# Patient Record
Sex: MALE | Race: WHITE | NOT HISPANIC OR LATINO | URBAN - METROPOLITAN AREA
[De-identification: names, ages, dates, MRNs, and addresses within clinical notes are randomized per-mention and may not be internally consistent; named-entity substitution may affect disease eponyms.]

---

## 2017-06-19 ENCOUNTER — HISTORICAL (OUTPATIENT)
Dept: ADMINISTRATIVE | Facility: HOSPITAL | Age: 47
End: 2017-06-19

## 2017-06-19 LAB
ABS NEUT (OLG): 2.68 X10(3)/MCL (ref 2.1–9.2)
ALBUMIN SERPL-MCNC: 4.4 GM/DL (ref 3.4–5)
ALBUMIN/GLOB SERPL: 1 RATIO (ref 1–2)
ALP SERPL-CCNC: 91 UNIT/L (ref 20–120)
ALT SERPL-CCNC: 42 UNIT/L
APPEARANCE, UA: CLEAR
AST SERPL-CCNC: 25 UNIT/L
BACTERIA #/AREA URNS AUTO: ABNORMAL /[HPF]
BASOPHILS # BLD AUTO: 0.05 X10(3)/MCL
BASOPHILS NFR BLD AUTO: 1 % (ref 0–1)
BILIRUB SERPL-MCNC: 1.4 MG/DL
BILIRUB UR QL STRIP: NEGATIVE
BILIRUBIN DIRECT+TOT PNL SERPL-MCNC: 0.2 MG/DL
BILIRUBIN DIRECT+TOT PNL SERPL-MCNC: 1.2 MG/DL
BUN SERPL-MCNC: 24 MG/DL (ref 7–25)
CALCIUM SERPL-MCNC: 8.9 MG/DL (ref 8.4–10.3)
CHLORIDE SERPL-SCNC: 98 MMOL/L (ref 96–110)
CHOLEST SERPL-MCNC: 217 MG/DL
CHOLEST/HDLC SERPL: 6.4 {RATIO} (ref 0–5)
CO2 SERPL-SCNC: 27 MMOL/L (ref 24–32)
COLOR UR: YELLOW
CREAT SERPL-MCNC: 1.14 MG/DL (ref 0.7–1.4)
CREAT UR-MCNC: 126.3 MG/DL
DEPRECATED CALCIDIOL+CALCIFEROL SERPL-MC: 12.35 NG/ML (ref 30–80)
EOSINOPHIL # BLD AUTO: 0.1 X10(3)/MCL
EOSINOPHIL NFR BLD AUTO: 2 % (ref 0–5)
ERYTHROCYTE [DISTWIDTH] IN BLOOD BY AUTOMATED COUNT: 12.9 % (ref 11.5–14.5)
EST. AVERAGE GLUCOSE BLD GHB EST-MCNC: 217 MG/DL
GLOBULIN SER-MCNC: 3.2 GM/ML (ref 2.3–3.5)
GLUCOSE (UA): >1000 MG/DL
GLUCOSE SERPL-MCNC: 218 MG/DL (ref 65–99)
HAV IGM SERPL QL IA: NONREACTIVE
HBA1C MFR BLD: 9.2 % (ref 4.7–5.6)
HBV CORE IGM SERPL QL IA: NONREACTIVE
HBV SURFACE AG SERPL QL IA: NEGATIVE
HCT VFR BLD AUTO: 51.4 % (ref 40–51)
HCV AB SERPL QL IA: NONREACTIVE
HDLC SERPL-MCNC: 34 MG/DL
HGB BLD-MCNC: 18.1 GM/DL (ref 13.5–17.5)
HGB UR QL STRIP: NEGATIVE
HIV 1+2 AB+HIV1 P24 AG SERPL QL IA: NONREACTIVE
HYALINE CASTS #/AREA URNS LPF: ABNORMAL /[LPF]
IMM GRANULOCYTES # BLD AUTO: 0.02 10*3/UL
IMM GRANULOCYTES NFR BLD AUTO: 0 %
KETONES UR QL STRIP: 10 MG/DL
LDLC SERPL CALC-MCNC: 146 MG/DL (ref 0–130)
LEUKOCYTE ESTERASE UR QL STRIP: NEGATIVE
LYMPHOCYTES # BLD AUTO: 1.77 X10(3)/MCL
LYMPHOCYTES NFR BLD AUTO: 36 % (ref 15–40)
MCH RBC QN AUTO: 30.5 PG (ref 26–34)
MCHC RBC AUTO-ENTMCNC: 35.2 GM/DL (ref 31–37)
MCV RBC AUTO: 86.5 FL (ref 80–100)
MICROALBUMIN UR-MCNC: 25 MG/L (ref 0–19)
MICROALBUMIN/CREAT RATIO PNL UR: 19.8 MCG/MG CR (ref 0–29)
MONOCYTES # BLD AUTO: 0.3 X10(3)/MCL
MONOCYTES NFR BLD AUTO: 6 % (ref 4–12)
NEUTROPHILS # BLD AUTO: 2.68 X10(3)/MCL
NEUTROPHILS NFR BLD AUTO: 54 X10(3)/MCL
NITRITE UR QL STRIP: NEGATIVE
PH UR STRIP: 6.5 [PH] (ref 4.5–8)
PLATELET # BLD AUTO: 228 X10(3)/MCL (ref 130–400)
PMV BLD AUTO: 10 FL (ref 7.4–10.4)
POTASSIUM SERPL-SCNC: 4.1 MMOL/L (ref 3.6–5.2)
PROT SERPL-MCNC: 7.6 GM/DL (ref 6–8)
PROT UR QL STRIP: 10 MG/DL
PSA SERPL-MCNC: 0.4 NG/ML (ref 0–4)
RBC # BLD AUTO: 5.94 X10(6)/MCL (ref 4.5–5.9)
RBC #/AREA URNS AUTO: ABNORMAL /[HPF]
SODIUM SERPL-SCNC: 133 MMOL/L (ref 135–146)
SP GR UR STRIP: 1.02 (ref 1–1.03)
SQUAMOUS #/AREA URNS LPF: ABNORMAL /[LPF]
TRIGL SERPL-MCNC: 184 MG/DL
TSH SERPL-ACNC: 1.58 MIU/L (ref 0.5–5)
UROBILINOGEN UR STRIP-ACNC: NORMAL
VLDLC SERPL CALC-MCNC: 37 MG/DL
WBC # SPEC AUTO: 4.9 X10(3)/MCL (ref 4.5–11)
WBC #/AREA URNS AUTO: ABNORMAL /HPF

## 2017-10-31 ENCOUNTER — HISTORICAL (OUTPATIENT)
Dept: INTERNAL MEDICINE | Facility: CLINIC | Age: 47
End: 2017-10-31

## 2017-10-31 LAB
APPEARANCE, UA: CLEAR
BACTERIA #/AREA URNS AUTO: ABNORMAL /[HPF]
BILIRUB UR QL STRIP: NEGATIVE
BUN SERPL-MCNC: 17 MG/DL (ref 7–18)
CALCIUM SERPL-MCNC: 8.8 MG/DL (ref 8.5–10.1)
CHLORIDE SERPL-SCNC: 103 MMOL/L (ref 98–107)
CHOLEST SERPL-MCNC: 216 MG/DL
CHOLEST/HDLC SERPL: 5.4 {RATIO} (ref 0–5)
CO2 SERPL-SCNC: 31 MMOL/L (ref 21–32)
COLOR UR: YELLOW
CREAT SERPL-MCNC: 1.2 MG/DL (ref 0.6–1.3)
EST. AVERAGE GLUCOSE BLD GHB EST-MCNC: 169 MG/DL
GLUCOSE (UA): 500 MG/DL
GLUCOSE SERPL-MCNC: 202 MG/DL (ref 74–106)
HBA1C MFR BLD: 7.5 % (ref 4.2–6.3)
HDLC SERPL-MCNC: 40 MG/DL
HGB UR QL STRIP: NEGATIVE
HYALINE CASTS #/AREA URNS LPF: ABNORMAL /[LPF]
KETONES UR QL STRIP: NEGATIVE
LDLC SERPL CALC-MCNC: 135 MG/DL (ref 0–130)
LEUKOCYTE ESTERASE UR QL STRIP: NEGATIVE
NITRITE UR QL STRIP: NEGATIVE
PH UR STRIP: 6.5 [PH] (ref 4.5–8)
POTASSIUM SERPL-SCNC: 4.2 MMOL/L (ref 3.5–5.1)
PROT UR QL STRIP: NEGATIVE
RBC #/AREA URNS AUTO: ABNORMAL /[HPF]
SODIUM SERPL-SCNC: 138 MMOL/L (ref 136–145)
SP GR UR STRIP: 1.01 (ref 1–1.03)
SQUAMOUS #/AREA URNS LPF: ABNORMAL /[LPF]
TRIGL SERPL-MCNC: 206 MG/DL
UROBILINOGEN UR STRIP-ACNC: 2 MG/DL
VLDLC SERPL CALC-MCNC: 41 MG/DL
WBC #/AREA URNS AUTO: ABNORMAL /HPF

## 2018-02-14 ENCOUNTER — HISTORICAL (OUTPATIENT)
Dept: INTERNAL MEDICINE | Facility: CLINIC | Age: 48
End: 2018-02-14

## 2018-02-14 LAB
BUN SERPL-MCNC: 13 MG/DL (ref 7–18)
CALCIUM SERPL-MCNC: 9.4 MG/DL (ref 8.5–10.1)
CHLORIDE SERPL-SCNC: 104 MMOL/L (ref 98–107)
CHOLEST SERPL-MCNC: 206 MG/DL
CHOLEST/HDLC SERPL: 6.4 {RATIO} (ref 0–5)
CO2 SERPL-SCNC: 30 MMOL/L (ref 21–32)
CREAT SERPL-MCNC: 1.1 MG/DL (ref 0.6–1.3)
CREAT UR-MCNC: 101 MG/DL
EST. AVERAGE GLUCOSE BLD GHB EST-MCNC: 163 MG/DL
GLUCOSE SERPL-MCNC: 170 MG/DL (ref 74–106)
HBA1C MFR BLD: 7.3 % (ref 4.2–6.3)
HDLC SERPL-MCNC: 32 MG/DL
LDLC SERPL CALC-MCNC: 141 MG/DL (ref 0–130)
MICROALBUMIN UR-MCNC: 16.7 MG/L (ref 0–19)
MICROALBUMIN/CREAT RATIO PNL UR: 16.5 MCG/MG CR (ref 0–29)
POTASSIUM SERPL-SCNC: 4.4 MMOL/L (ref 3.5–5.1)
SODIUM SERPL-SCNC: 138 MMOL/L (ref 136–145)
TRIGL SERPL-MCNC: 164 MG/DL
VLDLC SERPL CALC-MCNC: 33 MG/DL

## 2018-06-20 ENCOUNTER — HISTORICAL (OUTPATIENT)
Dept: INTERNAL MEDICINE | Facility: CLINIC | Age: 48
End: 2018-06-20

## 2018-06-20 LAB
BUN SERPL-MCNC: 15 MG/DL (ref 7–18)
CALCIUM SERPL-MCNC: 9.1 MG/DL (ref 8.5–10.1)
CHLORIDE SERPL-SCNC: 103 MMOL/L (ref 98–107)
CO2 SERPL-SCNC: 27 MMOL/L (ref 21–32)
CREAT SERPL-MCNC: 1.1 MG/DL (ref 0.6–1.3)
CREAT UR-MCNC: 92 MG/DL
CREAT/UREA NIT SERPL: 14
EST. AVERAGE GLUCOSE BLD GHB EST-MCNC: 151 MG/DL
GLUCOSE SERPL-MCNC: 160 MG/DL (ref 74–106)
HBA1C MFR BLD: 6.9 % (ref 4.2–6.3)
MICROALBUMIN UR-MCNC: 7.4 MG/L (ref 0–19)
MICROALBUMIN/CREAT RATIO PNL UR: 8 MCG/MG CR (ref 0–29)
POTASSIUM SERPL-SCNC: 4.1 MMOL/L (ref 3.5–5.1)
SODIUM SERPL-SCNC: 140 MMOL/L (ref 136–145)

## 2018-08-30 ENCOUNTER — HISTORICAL (OUTPATIENT)
Dept: INTERNAL MEDICINE | Facility: CLINIC | Age: 48
End: 2018-08-30

## 2018-08-30 LAB
ABS NEUT (OLG): 3.41 X10(3)/MCL (ref 2.1–9.2)
ALBUMIN SERPL-MCNC: 4 GM/DL (ref 3.4–5)
ALBUMIN/GLOB SERPL: 1 RATIO (ref 1–2)
ALP SERPL-CCNC: 107 UNIT/L (ref 45–117)
ALT SERPL-CCNC: 40 UNIT/L (ref 12–78)
AST SERPL-CCNC: 18 UNIT/L (ref 15–37)
BASOPHILS # BLD AUTO: 0.06 X10(3)/MCL
BASOPHILS NFR BLD AUTO: 1 %
BILIRUB SERPL-MCNC: 1.4 MG/DL (ref 0.2–1)
BILIRUBIN DIRECT+TOT PNL SERPL-MCNC: 0.3 MG/DL
BILIRUBIN DIRECT+TOT PNL SERPL-MCNC: 1.1 MG/DL
BUN SERPL-MCNC: 14 MG/DL (ref 7–18)
CALCIUM SERPL-MCNC: 9 MG/DL (ref 8.5–10.1)
CHLORIDE SERPL-SCNC: 102 MMOL/L (ref 98–107)
CHOLEST SERPL-MCNC: 190 MG/DL
CHOLEST/HDLC SERPL: 5.3 {RATIO} (ref 0–5)
CO2 SERPL-SCNC: 29 MMOL/L (ref 21–32)
CREAT SERPL-MCNC: 1.1 MG/DL (ref 0.6–1.3)
CREAT UR-MCNC: 56 MG/DL
EOSINOPHIL # BLD AUTO: 0.14 X10(3)/MCL
EOSINOPHIL NFR BLD AUTO: 2 %
ERYTHROCYTE [DISTWIDTH] IN BLOOD BY AUTOMATED COUNT: 13 % (ref 11.5–14.5)
EST. AVERAGE GLUCOSE BLD GHB EST-MCNC: 131 MG/DL
GLOBULIN SER-MCNC: 4 GM/ML (ref 2.3–3.5)
GLUCOSE SERPL-MCNC: 138 MG/DL (ref 74–106)
HAV IGM SERPL QL IA: NONREACTIVE
HBA1C MFR BLD: 6.2 % (ref 4.2–6.3)
HBV CORE IGM SERPL QL IA: NONREACTIVE
HBV SURFACE AG SERPL QL IA: NEGATIVE
HCT VFR BLD AUTO: 52.9 % (ref 40–51)
HCV AB SERPL QL IA: NONREACTIVE
HDLC SERPL-MCNC: 36 MG/DL
HGB BLD-MCNC: 18.3 GM/DL (ref 13.5–17.5)
HIV 1+2 AB+HIV1 P24 AG SERPL QL IA: NONREACTIVE
IMM GRANULOCYTES # BLD AUTO: 0.02 10*3/UL
IMM GRANULOCYTES NFR BLD AUTO: 0 %
LDLC SERPL CALC-MCNC: 118 MG/DL (ref 0–130)
LYMPHOCYTES # BLD AUTO: 2.02 X10(3)/MCL
LYMPHOCYTES NFR BLD AUTO: 33 % (ref 13–40)
MCH RBC QN AUTO: 31.4 PG (ref 26–34)
MCHC RBC AUTO-ENTMCNC: 34.6 GM/DL (ref 31–37)
MCV RBC AUTO: 90.7 FL (ref 80–100)
MICROALBUMIN UR-MCNC: <5 MG/L (ref 0–19)
MICROALBUMIN/CREAT RATIO PNL UR: <8.9 MCG/MG CR (ref 0–29)
MONOCYTES # BLD AUTO: 0.44 X10(3)/MCL
MONOCYTES NFR BLD AUTO: 7 % (ref 4–12)
NEUTROPHILS # BLD AUTO: 3.41 X10(3)/MCL
NEUTROPHILS NFR BLD AUTO: 56 X10(3)/MCL
PLATELET # BLD AUTO: 274 X10(3)/MCL (ref 130–400)
PMV BLD AUTO: 9.8 FL (ref 7.4–10.4)
POTASSIUM SERPL-SCNC: 4.2 MMOL/L (ref 3.5–5.1)
PROT SERPL-MCNC: 8 GM/DL (ref 6.4–8.2)
RBC # BLD AUTO: 5.83 X10(6)/MCL (ref 4.5–5.9)
SODIUM SERPL-SCNC: 136 MMOL/L (ref 136–145)
TRIGL SERPL-MCNC: 179 MG/DL
TSH SERPL-ACNC: 3.09 MIU/L (ref 0.36–3.74)
VLDLC SERPL CALC-MCNC: 36 MG/DL
WBC # SPEC AUTO: 6.1 X10(3)/MCL (ref 4.5–11)

## 2019-01-17 ENCOUNTER — HISTORICAL (OUTPATIENT)
Dept: INTERNAL MEDICINE | Facility: CLINIC | Age: 49
End: 2019-01-17

## 2019-01-17 LAB
ALBUMIN SERPL-MCNC: 3.9 GM/DL (ref 3.4–5)
ALBUMIN/GLOB SERPL: 1 RATIO (ref 1–2)
ALP SERPL-CCNC: 105 UNIT/L (ref 45–117)
ALT SERPL-CCNC: 25 UNIT/L (ref 12–78)
AST SERPL-CCNC: 15 UNIT/L (ref 15–37)
BILIRUB SERPL-MCNC: 1.2 MG/DL (ref 0.2–1)
BILIRUBIN DIRECT+TOT PNL SERPL-MCNC: 0.3 MG/DL
BILIRUBIN DIRECT+TOT PNL SERPL-MCNC: 0.9 MG/DL
BUN SERPL-MCNC: 12 MG/DL (ref 7–18)
CALCIUM SERPL-MCNC: 8.6 MG/DL (ref 8.5–10.1)
CHLORIDE SERPL-SCNC: 99 MMOL/L (ref 98–107)
CHOLEST SERPL-MCNC: 139 MG/DL
CHOLEST/HDLC SERPL: 3.9 {RATIO} (ref 0–5)
CO2 SERPL-SCNC: 32 MMOL/L (ref 21–32)
CREAT SERPL-MCNC: 1.2 MG/DL (ref 0.6–1.3)
CREAT UR-MCNC: 32 MG/DL
ERYTHROCYTE [DISTWIDTH] IN BLOOD BY AUTOMATED COUNT: 12.6 % (ref 11.5–14.5)
EST. AVERAGE GLUCOSE BLD GHB EST-MCNC: 151 MG/DL
GLOBULIN SER-MCNC: 4.1 GM/ML (ref 2.3–3.5)
GLUCOSE SERPL-MCNC: 137 MG/DL (ref 74–106)
HBA1C MFR BLD: 6.9 % (ref 4.2–6.3)
HCT VFR BLD AUTO: 50.7 % (ref 40–51)
HDLC SERPL-MCNC: 36 MG/DL
HGB BLD-MCNC: 17.7 GM/DL (ref 13.5–17.5)
LDLC SERPL CALC-MCNC: 73 MG/DL (ref 0–130)
MCH RBC QN AUTO: 30.9 PG (ref 26–34)
MCHC RBC AUTO-ENTMCNC: 34.9 GM/DL (ref 31–37)
MCV RBC AUTO: 88.5 FL (ref 80–100)
MICROALBUMIN UR-MCNC: <5 MG/L (ref 0–19)
MICROALBUMIN/CREAT RATIO PNL UR: NORMAL MCG/MG CR (ref 0–29)
PLATELET # BLD AUTO: 294 X10(3)/MCL (ref 130–400)
PMV BLD AUTO: 9.5 FL (ref 7.4–10.4)
POTASSIUM SERPL-SCNC: 4 MMOL/L (ref 3.5–5.1)
PROT SERPL-MCNC: 8 GM/DL (ref 6.4–8.2)
RBC # BLD AUTO: 5.73 X10(6)/MCL (ref 4.5–5.9)
SODIUM SERPL-SCNC: 135 MMOL/L (ref 136–145)
TRIGL SERPL-MCNC: 148 MG/DL
VLDLC SERPL CALC-MCNC: 30 MG/DL
WBC # SPEC AUTO: 6.3 X10(3)/MCL (ref 4.5–11)

## 2019-09-22 ENCOUNTER — HISTORICAL (OUTPATIENT)
Dept: LAB | Facility: HOSPITAL | Age: 49
End: 2019-09-22

## 2019-09-22 LAB
ALBUMIN SERPL-MCNC: 3.8 GM/DL (ref 3.4–5)
ALBUMIN/GLOB SERPL: 1.1 RATIO (ref 1.1–2)
ALP SERPL-CCNC: 87 UNIT/L (ref 45–117)
ALT SERPL-CCNC: 26 UNIT/L (ref 12–78)
AST SERPL-CCNC: 14 UNIT/L (ref 15–37)
BILIRUB SERPL-MCNC: 1.2 MG/DL (ref 0.2–1)
BILIRUBIN DIRECT+TOT PNL SERPL-MCNC: 0.3 MG/DL (ref 0–0.2)
BILIRUBIN DIRECT+TOT PNL SERPL-MCNC: 0.9 MG/DL
BUN SERPL-MCNC: 16 MG/DL (ref 7–18)
CALCIUM SERPL-MCNC: 8.5 MG/DL (ref 8.5–10.1)
CHLORIDE SERPL-SCNC: 103 MMOL/L (ref 98–107)
CO2 SERPL-SCNC: 29 MMOL/L (ref 21–32)
CREAT SERPL-MCNC: 1.1 MG/DL (ref 0.6–1.3)
CREAT UR-MCNC: 169 MG/DL
EST. AVERAGE GLUCOSE BLD GHB EST-MCNC: 131 MG/DL
GLOBULIN SER-MCNC: 3.5 GM/ML (ref 2.3–3.5)
GLUCOSE SERPL-MCNC: 126 MG/DL (ref 74–106)
HBA1C MFR BLD: 6.2 % (ref 4.2–6.3)
MICROALBUMIN UR-MCNC: 6.3 MG/L (ref 0–19)
MICROALBUMIN/CREAT RATIO PNL UR: 3.7 MCG/MG CR (ref 0–29)
POTASSIUM SERPL-SCNC: 3.9 MMOL/L (ref 3.5–5.1)
PROT SERPL-MCNC: 7.3 GM/DL (ref 6.4–8.2)
SODIUM SERPL-SCNC: 138 MMOL/L (ref 136–145)

## 2020-05-25 ENCOUNTER — HISTORICAL (OUTPATIENT)
Dept: INTERNAL MEDICINE | Facility: CLINIC | Age: 50
End: 2020-05-25

## 2020-05-25 LAB
ABS NEUT (OLG): 3.65 X10(3)/MCL (ref 2.1–9.2)
ALBUMIN SERPL-MCNC: 3.6 GM/DL (ref 3.4–5)
ALBUMIN/GLOB SERPL: 1 RATIO (ref 1.1–2)
ALP SERPL-CCNC: 95 UNIT/L (ref 45–117)
ALT SERPL-CCNC: 28 UNIT/L (ref 12–78)
APPEARANCE, UA: CLEAR
AST SERPL-CCNC: 20 UNIT/L (ref 15–37)
BACTERIA #/AREA URNS AUTO: ABNORMAL /HPF
BASOPHILS # BLD AUTO: 0.1 X10(3)/MCL (ref 0–0.2)
BASOPHILS NFR BLD AUTO: 1 %
BILIRUB SERPL-MCNC: 1.2 MG/DL (ref 0.2–1)
BILIRUB UR QL STRIP: NEGATIVE
BILIRUBIN DIRECT+TOT PNL SERPL-MCNC: 0.2 MG/DL (ref 0–0.2)
BILIRUBIN DIRECT+TOT PNL SERPL-MCNC: 1 MG/DL
BUN SERPL-MCNC: 20 MG/DL (ref 7–18)
CALCIUM SERPL-MCNC: 8.6 MG/DL (ref 8.5–10.1)
CHLORIDE SERPL-SCNC: 106 MMOL/L (ref 98–107)
CHOLEST SERPL-MCNC: 143 MG/DL
CHOLEST/HDLC SERPL: 4.1 {RATIO} (ref 0–5)
CO2 SERPL-SCNC: 29 MMOL/L (ref 21–32)
COLOR UR: ABNORMAL
CREAT SERPL-MCNC: 1.1 MG/DL (ref 0.6–1.3)
CREAT UR-MCNC: 54 MG/DL
EOSINOPHIL # BLD AUTO: 0.1 X10(3)/MCL (ref 0–0.9)
EOSINOPHIL NFR BLD AUTO: 2 %
ERYTHROCYTE [DISTWIDTH] IN BLOOD BY AUTOMATED COUNT: 13.1 % (ref 11.5–14.5)
EST. AVERAGE GLUCOSE BLD GHB EST-MCNC: 157 MG/DL
GLOBULIN SER-MCNC: 3.5 GM/ML (ref 2.3–3.5)
GLUCOSE (UA): 300 MG/DL
GLUCOSE SERPL-MCNC: 157 MG/DL (ref 74–106)
HAV IGM SERPL QL IA: NONREACTIVE
HBA1C MFR BLD: 7.1 % (ref 4.2–6.3)
HBV CORE IGM SERPL QL IA: NONREACTIVE
HBV SURFACE AG SERPL QL IA: NONREACTIVE
HCT VFR BLD AUTO: 46.9 % (ref 40–51)
HCV AB SERPL QL IA: NONREACTIVE
HDLC SERPL-MCNC: 35 MG/DL (ref 40–59)
HGB BLD-MCNC: 16 GM/DL (ref 13.5–17.5)
HGB UR QL STRIP: NEGATIVE
HIV 1+2 AB+HIV1 P24 AG SERPL QL IA: NONREACTIVE
HYALINE CASTS #/AREA URNS LPF: ABNORMAL /LPF
IMM GRANULOCYTES # BLD AUTO: 0.02 10*3/UL
IMM GRANULOCYTES NFR BLD AUTO: 0 %
KETONES UR QL STRIP: NEGATIVE
LDLC SERPL CALC-MCNC: 80 MG/DL
LEUKOCYTE ESTERASE UR QL STRIP: NEGATIVE
LYMPHOCYTES # BLD AUTO: 1.4 X10(3)/MCL (ref 0.6–4.6)
LYMPHOCYTES NFR BLD AUTO: 25 %
MCH RBC QN AUTO: 30.8 PG (ref 26–34)
MCHC RBC AUTO-ENTMCNC: 34.1 GM/DL (ref 31–37)
MCV RBC AUTO: 90.2 FL (ref 80–100)
MICROALBUMIN UR-MCNC: <5 MG/L (ref 0–19)
MICROALBUMIN/CREAT RATIO PNL UR: <9.3 MCG/MG CR (ref 0–29)
MONOCYTES # BLD AUTO: 0.4 X10(3)/MCL (ref 0.1–1.3)
MONOCYTES NFR BLD AUTO: 8 %
NEUTROPHILS # BLD AUTO: 3.65 X10(3)/MCL (ref 2.1–9.2)
NEUTROPHILS NFR BLD AUTO: 64 %
NITRITE UR QL STRIP: NEGATIVE
PH UR STRIP: 7 [PH] (ref 4.5–8)
PLATELET # BLD AUTO: 224 X10(3)/MCL (ref 130–400)
PMV BLD AUTO: 9.3 FL (ref 7.4–10.4)
POTASSIUM SERPL-SCNC: 4.2 MMOL/L (ref 3.5–5.1)
PROT SERPL-MCNC: 7.1 GM/DL (ref 6.4–8.2)
PROT UR QL STRIP: NEGATIVE
RBC # BLD AUTO: 5.2 X10(6)/MCL (ref 4.5–5.9)
RBC #/AREA URNS AUTO: ABNORMAL /HPF
SODIUM SERPL-SCNC: 138 MMOL/L (ref 136–145)
SP GR UR STRIP: 1.01 (ref 1–1.03)
SQUAMOUS #/AREA URNS LPF: ABNORMAL /LPF
TRIGL SERPL-MCNC: 141 MG/DL
TSH SERPL-ACNC: 2.3 MIU/L (ref 0.36–3.74)
UROBILINOGEN UR STRIP-ACNC: NORMAL
VLDLC SERPL CALC-MCNC: 28 MG/DL
WBC # SPEC AUTO: 5.7 X10(3)/MCL (ref 4.5–11)
WBC #/AREA URNS AUTO: ABNORMAL /HPF

## 2021-06-02 ENCOUNTER — HISTORICAL (OUTPATIENT)
Dept: INTERNAL MEDICINE | Facility: CLINIC | Age: 51
End: 2021-06-02

## 2021-06-02 LAB
ALBUMIN SERPL-MCNC: 4.1 GM/DL (ref 3.5–5)
ALBUMIN/GLOB SERPL: 1.2 RATIO (ref 1.1–2)
ALP SERPL-CCNC: 101 UNIT/L (ref 40–150)
ALT SERPL-CCNC: 18 UNIT/L (ref 0–55)
AST SERPL-CCNC: 18 UNIT/L (ref 5–34)
BILIRUB SERPL-MCNC: 1.4 MG/DL
BILIRUBIN DIRECT+TOT PNL SERPL-MCNC: 0.4 MG/DL (ref 0–0.5)
BILIRUBIN DIRECT+TOT PNL SERPL-MCNC: 1 MG/DL (ref 0–0.8)
BUN SERPL-MCNC: 10 MG/DL (ref 8.9–20.6)
CALCIUM SERPL-MCNC: 9.3 MG/DL (ref 8.4–10.2)
CHLORIDE SERPL-SCNC: 100 MMOL/L (ref 98–107)
CHOLEST SERPL-MCNC: 210 MG/DL
CHOLEST/HDLC SERPL: 6 {RATIO} (ref 0–5)
CO2 SERPL-SCNC: 27 MMOL/L (ref 22–29)
CREAT SERPL-MCNC: 1.15 MG/DL (ref 0.73–1.18)
CREAT UR-MCNC: 41.6 MG/DL (ref 58–161)
EST. AVERAGE GLUCOSE BLD GHB EST-MCNC: 145.6 MG/DL
GLOBULIN SER-MCNC: 3.4 GM/DL (ref 2.4–3.5)
GLUCOSE SERPL-MCNC: 145 MG/DL (ref 74–100)
HBA1C MFR BLD: 6.7 %
HDLC SERPL-MCNC: 37 MG/DL (ref 35–60)
LDLC SERPL CALC-MCNC: 149 MG/DL (ref 50–140)
MICROALBUMIN UR-MCNC: <5 MG/L
MICROALBUMIN/CREAT RATIO PNL UR: <12 MG/GM CR (ref 0–30)
POTASSIUM SERPL-SCNC: 4.5 MMOL/L (ref 3.5–5.1)
PROT SERPL-MCNC: 7.5 GM/DL (ref 6.4–8.3)
SODIUM SERPL-SCNC: 138 MMOL/L (ref 136–145)
TRIGL SERPL-MCNC: 119 MG/DL (ref 34–140)
VLDLC SERPL CALC-MCNC: 24 MG/DL

## 2021-06-07 ENCOUNTER — HISTORICAL (OUTPATIENT)
Dept: INTERNAL MEDICINE | Facility: CLINIC | Age: 51
End: 2021-06-07

## 2022-05-02 NOTE — HISTORICAL [facility]
This is a historical note converted from [employer]. Formatting and pictures may have been removed.  Please reference [employer] for original formatting and attached multimedia. 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 Recently dx with DM2 at [facility] physical. ?Reports 1 year ago [facility] physical was told that he had glucose in his urine, but did not seek?medical attention after his physical.? Was restricted to have UTI physical?completed?in 1 year, which she completed on May 24, 2017.? Reported that he had?over thousand glucose in his urine and A1c?was?10.4%.? Has never been on diabetic medications or diet.? Reports his father was a diabetic.? Blood pressure elevated today, admits that he had high blood pressure years ago is on medication. ?Does not recall which medication it was. ?He had an eye exam March 2017, but it was not dilated exam. ?Additionally has history of obstructive sleep apnea, currently on CPAP nightly. ?Needs new prescription for new mask.? His diagnosis he has?done making?dietary changes, including eliminating sweets,?cutting down on carbohydrates.? He would like to start exercising soon.? He is well educated on proper diet for diabetes and would like more information.  Review of Systems  Constitutional: negative except as stated in HPI  Eye: negative except as stated in HPI  ENMT: negative except as stated in HPI  Respiratory: negative except as stated in HPI  Cardiovascular: negative except as stated in HPI  Gastrointestinal: negative except as stated in HPI  Genitourinary: negative except as stated in HPI  Hema/Lymph: negative except as stated in HPI  Endocrine: negative except as stated in HPI  Immunologic: negative except as stated in HPI  Musculoskeletal: negative except as stated in HPI  Integumentary: negative except as stated in HPI  Neurologic: negative except as stated in HPI  ?   All Other ROS_ ?negative except as  stated in HPI  Physical Exam  Vitals & Measurements  T:?36.7? ?C ?(Oral)? HR:?88?(Peripheral)? BP:?153/79? SpO2:?97%?  HT:?169?cm? HT:?169?cm? WT:?102?kg? WT:?102?kg? BMI:?35.71?  General: Alert and oriented, No acute distress.? Smells of tobacco smoke  Eye: Pupils are equal, round and reactive to light, Extraocular movements are intact.  HENT: Normocephalic.  Neck: Supple, Non-tender, No carotid bruit, No lymphadenopathy.  Respiratory: Lungs are clear to auscultation, Respirations are non-labored, Breath sounds are equal, Symmetrical chest wall expansion.  Cardiovascular: Normal rate, Regular rhythm, No murmur.  Gastrointestinal: Soft, Non-tender, Non-distended, Normal bowel sounds.  Musculoskeletal: Normal range of motion.  Integumentary: Warm, Dry, Intact.  Neurologic: No focal deficits, Cranial Nerves II-XII are grossly intact.  Assessment/Plan  1.?Type 2 diabetes mellitus  newly dx  ?ADA diet  CBG log  baby asa daily  eye exam referral  foot exam completed today  pneumovax-self pay  pcv 13-self pay  hep b-self pay  last LDL check today  last A1c 10.4 per pt report at recent DOT physical 5/24/17  start metformin er daily x 2 wks and then increase to bid  start acei  labs today and with rtc  Ordered:  Basic Metabolic Panel, Routine collect, *Est. 09/19/17 3:00:00 CDT, Blood, Order for future visit, *Est. Stop date 09/19/17 3:00:00 CDT, Lab Collect, Type 2 diabetes mellitus  Well adult exam, 3, month(s), In Approximately, 06/19/17 9:45:00 CDT  CBC w/ Auto Diff, Routine collect, *Est. 06/19/17 3:00:00 CDT, Blood, Order for future visit, *Est. Stop date 06/19/17 3:00:00 CDT, Lab Collect, Type 2 diabetes mellitus  Well adult exam, Sometime Before, 06/19/17 9:45:00 CDT  Clinic Follow up, *Est. 09/19/17 3:00:00 CDT, in 3 mos with NIMO Jerry, Order for future visit, Type 2 diabetes mellitus, [facility]  Clinic Follow up, *Est. 07/19/17 3:00:00 CDT, NURSE VISIT: BP check, 1 month, Order for future visit, Type 2  diabetes mellitus  HTN (hypertension)  Well adult exam  CHRISTIAN on CPAP, [facility]  Comprehensive Metabolic Panel, Routine collect, *Est. 06/19/17 3:00:00 CDT, Blood, Order for future visit, *Est. Stop date 06/19/17 3:00:00 CDT, Lab Collect, Type 2 diabetes mellitus  Well adult exam, Sometime Before, 06/19/17 9:45:00 CDT  Hemoglobin A1C [facility], Routine collect, *Est. 09/19/17 3:00:00 CDT, Blood, Order for future visit, *Est. Stop date 09/19/17 3:00:00 CDT, Lab Collect, Type 2 diabetes mellitus  Well adult exam, 3, month(s), In Approximately, 06/19/17 9:45:00 CDT  Hemoglobin A1C [facility], Routine collect, *Est. 06/19/17 3:00:00 CDT, Blood, Order for future visit, *Est. Stop date 06/19/17 3:00:00 CDT, Lab Collect, Type 2 diabetes mellitus  Well adult exam, Sometime Before, 06/19/17 9:45:00 CDT  Hepatitis Panel [facility], Routine collect, *Est. 06/19/17 3:00:00 CDT, Blood, Order for future visit, *Est. Stop date 06/19/17 3:00:00 CDT, Lab Collect, Type 2 diabetes mellitus  Well adult exam, Sometime Before, 06/19/17 9:45:00 CDT  HIV 1 and 2, Routine collect, *Est. 06/19/17 3:00:00 CDT, Blood, Order for future visit, *Est. Stop date 06/19/17 3:00:00 CDT, Lab Collect, Type 2 diabetes mellitus  Well adult exam, Sometime Before, 06/19/17 9:45:00 CDT  Internal Referral to Diabetes Education, Diabetes Education, *Est. 06/19/17 3:00:00 CDT, Future Visit?, Type 2 diabetes mellitus  Internal Referral to Nutrition, DM education, *Est. 07/19/17 3:00:00 CDT, Future Visit?, Type 2 diabetes mellitus  Internal Referral to Ophthalmology, DM eye exam, *Est. 06/19/17 3:00:00 CDT, Future Visit?, Type 2 diabetes mellitus  Lipid Panel, Routine collect, *Est. 09/19/17 3:00:00 CDT, Blood, Order for future visit, *Est. Stop date 09/19/17 3:00:00 CDT, Lab Collect, Type 2 diabetes mellitus  Well adult exam, 3, month(s), In Approximately, 06/19/17 9:45:00 CDT  Lipid Panel, Routine collect, *Est. 06/19/17 3:00:00 CDT, Blood, Order for future visit,  *Est. Stop date 06/19/17 3:00:00 CDT, Lab Collect, Type 2 diabetes mellitus  Well adult exam, Sometime Before, 06/19/17 9:45:00 CDT  Microalbumin Level Urine, Routine collect, Urine, Order for future visit, *Est. 06/19/17 3:00:00 CDT, *Est. Stop date 06/19/17 3:00:00 CDT, Nurse collect, Type 2 diabetes mellitus  Well adult exam, month(s), Sometime Before, 06/19/17 9:45:00 CDT  Microalbumin Level Urine, Routine collect, Urine, Order for future visit, *Est. 09/19/17 3:00:00 CDT, *Est. Stop date 09/19/17 3:00:00 CDT, Nurse collect, Type 2 diabetes mellitus  Well adult exam, day(s), 3, month(s), In Approximately, 06/19/17 9:45:00 CDT  Office/Outpatient Visit Level 4 New [id number] PC, Type 2 diabetes mellitus  HTN (hypertension)  Well adult exam  CHRISTIAN on CPAP, [facility], 06/19/17 9:59:00 CDT  Prostatic Specific Antigen Screening Test, Routine collect, *Est. 06/19/17 3:00:00 CDT, Blood, Order for future visit, *Est. Stop date 06/19/17 3:00:00 CDT, Lab Collect, Type 2 diabetes mellitus  Well adult exam, Sometime Before, 06/19/17 9:45:00 CDT  Thyroid Stimulating Hormone, Routine collect, *Est. 06/19/17 3:00:00 CDT, Blood, Order for future visit, *Est. Stop date 06/19/17 3:00:00 CDT, Lab Collect, Type 2 diabetes mellitus  Well adult exam, Sometime Before, 06/19/17 9:45:00 CDT  Urinalysis with Microscopic if Indicated, Routine collect, Urine, Order for future visit, *Est. 06/19/17 3:00:00 CDT, *Est. Stop date 06/19/17 3:00:00 CDT, Nurse collect, Type 2 diabetes mellitus  Well adult exam, month(s), Sometime Before, 06/19/17 9:45:00 CDT  Vitamin D, 25-Hydroxy Level, Routine collect, *Est. 06/19/17 3:00:00 CDT, Blood, Order for future visit, *Est. Stop date 06/19/17 3:00:00 CDT, Lab Collect, Type 2 diabetes mellitus  Well adult exam, Sometime Before, 06/19/17 9:45:00 CDT  ?  2.?HTN (hypertension)  elevated  start lisinopril 10mg daily  low sodium diet  enc regular exercise as tolerated along with maintaining healthy  weight  enc smoking cessation  refrain from excessive ETOH consumption  Ordered:  Clinic Follow up, *Est. 07/19/17 3:00:00 CDT, NURSE VISIT: BP check, 1 month, Order for future visit, Type 2 diabetes mellitus  HTN (hypertension)  Well adult exam  CHRISTIAN on CPAP, [facility]  Office/Outpatient Visit Level 4 New [id number] PC, Type 2 diabetes mellitus  HTN (hypertension)  Well adult exam  CHRISTIAN on CPAP, [facility], 06/19/17 9:59:00 CDT  ?  3.?Well adult exam  ?labs today  Ordered:  Basic Metabolic Panel, Routine collect, *Est. 09/19/17 3:00:00 CDT, Blood, Order for future visit, *Est. Stop date 09/19/17 3:00:00 CDT, Lab Collect, Type 2 diabetes mellitus  Well adult exam, 3, month(s), In Approximately, 06/19/17 9:45:00 CDT  CBC w/ Auto Diff, Routine collect, *Est. 06/19/17 3:00:00 CDT, Blood, Order for future visit, *Est. Stop date 06/19/17 3:00:00 CDT, Lab Collect, Type 2 diabetes mellitus  Well adult exam, Sometime Before, 06/19/17 9:45:00 CDT  Clinic Follow up, *Est. 07/19/17 3:00:00 CDT, NURSE VISIT: BP check, 1 month, Order for future visit, Type 2 diabetes mellitus  HTN (hypertension)  Well adult exam  CHRISTIAN on CPAP, [facility]  Comprehensive Metabolic Panel, Routine collect, *Est. 06/19/17 3:00:00 CDT, Blood, Order for future visit, *Est. Stop date 06/19/17 3:00:00 CDT, Lab Collect, Type 2 diabetes mellitus  Well adult exam, Sometime Before, 06/19/17 9:45:00 CDT  Hemoglobin A1C [facility], Routine collect, *Est. 09/19/17 3:00:00 CDT, Blood, Order for future visit, *Est. Stop date 09/19/17 3:00:00 CDT, Lab Collect, Type 2 diabetes mellitus  Well adult exam, 3, month(s), In Approximately, 06/19/17 9:45:00 CDT  Hemoglobin A1C [facility], Routine collect, *Est. 06/19/17 3:00:00 CDT, Blood, Order for future visit, *Est. Stop date 06/19/17 3:00:00 CDT, Lab Collect, Type 2 diabetes mellitus  Well adult exam, Sometime Before, 06/19/17 9:45:00 CDT  Hepatitis Panel [facility], Routine collect, *Est. 06/19/17 3:00:00 CDT, Blood,  Order for future visit, *Est. Stop date 06/19/17 3:00:00 CDT, Lab Collect, Type 2 diabetes mellitus  Well adult exam, Sometime Before, 06/19/17 9:45:00 CDT  HIV 1 and 2, Routine collect, *Est. 06/19/17 3:00:00 CDT, Blood, Order for future visit, *Est. Stop date 06/19/17 3:00:00 CDT, Lab Collect, Type 2 diabetes mellitus  Well adult exam, Sometime Before, 06/19/17 9:45:00 CDT  Lipid Panel, Routine collect, *Est. 09/19/17 3:00:00 CDT, Blood, Order for future visit, *Est. Stop date 09/19/17 3:00:00 CDT, Lab Collect, Type 2 diabetes mellitus  Well adult exam, 3, month(s), In Approximately, 06/19/17 9:45:00 CDT  Lipid Panel, Routine collect, *Est. 06/19/17 3:00:00 CDT, Blood, Order for future visit, *Est. Stop date 06/19/17 3:00:00 CDT, Lab Collect, Type 2 diabetes mellitus  Well adult exam, Sometime Before, 06/19/17 9:45:00 CDT  Microalbumin Level Urine, Routine collect, Urine, Order for future visit, *Est. 06/19/17 3:00:00 CDT, *Est. Stop date 06/19/17 3:00:00 CDT, Nurse collect, Type 2 diabetes mellitus  Well adult exam, month(s), Sometime Before, 06/19/17 9:45:00 CDT  Microalbumin Level Urine, Routine collect, Urine, Order for future visit, *Est. 09/19/17 3:00:00 CDT, *Est. Stop date 09/19/17 3:00:00 CDT, Nurse collect, Type 2 diabetes mellitus  Well adult exam, day(s), 3, month(s), In Approximately, 06/19/17 9:45:00 CDT  Office/Outpatient Visit Level 4 New [id number] PC, Type 2 diabetes mellitus  HTN (hypertension)  Well adult exam  CHRISITAN on CPAP, [facility], 06/19/17 9:59:00 CDT  Prostatic Specific Antigen Screening Test, Routine collect, *Est. 06/19/17 3:00:00 CDT, Blood, Order for future visit, *Est. Stop date 06/19/17 3:00:00 CDT, Lab Collect, Type 2 diabetes mellitus  Well adult exam, Sometime Before, 06/19/17 9:45:00 CDT  Thyroid Stimulating Hormone, Routine collect, *Est. 06/19/17 3:00:00 CDT, Blood, Order for future visit, *Est. Stop date 06/19/17 3:00:00 CDT, Lab Collect, Type 2 diabetes mellitus   Well adult exam, Sometime Before, 06/19/17 9:45:00 CDT  Urinalysis with Microscopic if Indicated, Routine collect, Urine, Order for future visit, *Est. 06/19/17 3:00:00 CDT, *Est. Stop date 06/19/17 3:00:00 CDT, Nurse collect, Type 2 diabetes mellitus  Well adult exam, month(s), Sometime Before, 06/19/17 9:45:00 CDT  Vitamin D, 25-Hydroxy Level, Routine collect, *Est. 06/19/17 3:00:00 CDT, Blood, Order for future visit, *Est. Stop date 06/19/17 3:00:00 CDT, Lab Collect, Type 2 diabetes mellitus  Well adult exam, Sometime Before, 06/19/17 9:45:00 CDT  ?  CHRISTIAN on CPAP  rx for new mask  Ordered:  Misc Prescription, CPAP Mask, See Instructions, Full face CPAP mask, # 1 EA, 4 Refill(s), Pharmacy: [facility]  Clinic Follow up, *Est. 07/19/17 3:00:00 CDT, NURSE VISIT: BP check, 1 month, Order for future visit, Type 2 diabetes mellitus  HTN (hypertension)  Well adult exam  CHRISTIAN on CPAP, [facility]  Office/Outpatient Visit Level 4 New [id number] PC, Type 2 diabetes mellitus  HTN (hypertension)  Well adult exam  CHRISTIAN on CPAP, [facility], 06/19/17 9:59:00 CDT  ?  Orders:  aspirin, 81 mg = 1 tab(s), Oral, Daily, # 30 tab(s), 0 Refill(s), other reason (Rx)  lisinopril, 10 mg = 1 tab(s), Oral, Daily, # 90 tab(s), 1 Refill(s), Pharmacy: [id number]  metFORMIN, 500 mg = 1 tab(s), Oral, BID, # 180 tab(s), 1 Refill(s), Pharmacy: [id number]  RTC 3 mos  RTC 1 mo for bp check   Problem List/Past Medical History  HTN (hypertension)  Obesity  Tobacco user  Type 2 diabetes mellitus  Historical  No historical problems  Medications  aspirin 81 mg oral Delayed Release (EC) tablet, 81 mg, 1 tab(s), Oral, Daily  CPAP Mask, See Instructions, 4 refills  lisinopril 10 mg oral tablet, 10 mg, 1 tab(s), Oral, Daily, 1 refills  metFORMIN 500 mg oral tablet, extended release, 500 mg, 1 tab(s), Oral, BID, 1 refills  Allergies  No Known Allergies  Social History  Alcohol  Current, Beer, 1-2 times per  month  Employment/School  Employed, Highest education level: High school. Operates hazardous equipment: No.  Exercise  Exercise duration: 0.  Home/Environment  Lives with Significant other. Living situation: Home/Independent. Home equipment: CPAP/BiPAP. Alcohol abuse in household: No. Substance abuse in household: No. Smoker in household: Yes.  Nutrition/Health  Regular  Sexual  Sexually active: Yes.  Substance Abuse  Never  Tobacco  Current every day smoker, Cigarettes, 10 per day. 3 year(s). Started age 44.0 Years.      Dx CHRISTIAN on CPAP  -Patient is compliant with CPAP use  -Patient is benefiting from CPAP use  -Patient needs to continue CPAP usage

## 2022-06-06 ENCOUNTER — CLINICAL SUPPORT (OUTPATIENT)
Dept: INTERNAL MEDICINE | Facility: CLINIC | Age: 52
End: 2022-06-06
Attending: NURSE PRACTITIONER

## 2022-06-06 ENCOUNTER — OFFICE VISIT (OUTPATIENT)
Dept: INTERNAL MEDICINE | Facility: CLINIC | Age: 52
End: 2022-06-06

## 2022-06-06 ENCOUNTER — LAB VISIT (OUTPATIENT)
Dept: LAB | Facility: HOSPITAL | Age: 52
End: 2022-06-06
Attending: NURSE PRACTITIONER

## 2022-06-06 VITALS
OXYGEN SATURATION: 98 % | WEIGHT: 213.81 LBS | SYSTOLIC BLOOD PRESSURE: 128 MMHG | BODY MASS INDEX: 33.56 KG/M2 | TEMPERATURE: 99 F | DIASTOLIC BLOOD PRESSURE: 81 MMHG | HEIGHT: 67 IN | RESPIRATION RATE: 18 BRPM | HEART RATE: 81 BPM

## 2022-06-06 DIAGNOSIS — E66.9 OBESITY, UNSPECIFIED CLASSIFICATION, UNSPECIFIED OBESITY TYPE, UNSPECIFIED WHETHER SERIOUS COMORBIDITY PRESENT: ICD-10-CM

## 2022-06-06 DIAGNOSIS — I10 HYPERTENSION, UNSPECIFIED TYPE: ICD-10-CM

## 2022-06-06 DIAGNOSIS — M54.2 CERVICAL MUSCLE PAIN: ICD-10-CM

## 2022-06-06 DIAGNOSIS — E11.9 TYPE 2 DIABETES MELLITUS WITHOUT COMPLICATION, WITHOUT LONG-TERM CURRENT USE OF INSULIN: Primary | ICD-10-CM

## 2022-06-06 DIAGNOSIS — Z72.0 TOBACCO USER: ICD-10-CM

## 2022-06-06 DIAGNOSIS — G47.33 OBSTRUCTIVE SLEEP APNEA SYNDROME: ICD-10-CM

## 2022-06-06 DIAGNOSIS — K21.9 GASTROESOPHAGEAL REFLUX DISEASE, UNSPECIFIED WHETHER ESOPHAGITIS PRESENT: ICD-10-CM

## 2022-06-06 DIAGNOSIS — E78.5 HYPERLIPIDEMIA, UNSPECIFIED HYPERLIPIDEMIA TYPE: ICD-10-CM

## 2022-06-06 DIAGNOSIS — E11.9 TYPE 2 DIABETES MELLITUS WITHOUT COMPLICATION, WITHOUT LONG-TERM CURRENT USE OF INSULIN: ICD-10-CM

## 2022-06-06 DIAGNOSIS — H91.90 HEARING LOSS, UNSPECIFIED HEARING LOSS TYPE, UNSPECIFIED LATERALITY: ICD-10-CM

## 2022-06-06 DIAGNOSIS — E11.9 DM TYPE 2 (DIABETES MELLITUS, TYPE 2): Primary | ICD-10-CM

## 2022-06-06 DIAGNOSIS — B35.3 TINEA PEDIS, UNSPECIFIED LATERALITY: ICD-10-CM

## 2022-06-06 DIAGNOSIS — Z12.11 COLON CANCER SCREENING: ICD-10-CM

## 2022-06-06 LAB
ALBUMIN SERPL-MCNC: 4.1 GM/DL (ref 3.5–5)
ALBUMIN/GLOB SERPL: 1.2 RATIO (ref 1.1–2)
ALP SERPL-CCNC: 98 UNIT/L (ref 40–150)
ALT SERPL-CCNC: 34 UNIT/L (ref 0–55)
AST SERPL-CCNC: 18 UNIT/L (ref 5–34)
BASOPHILS # BLD AUTO: 0.07 X10(3)/MCL (ref 0–0.2)
BASOPHILS NFR BLD AUTO: 0.9 %
BILIRUBIN DIRECT+TOT PNL SERPL-MCNC: 1.5 MG/DL
BUN SERPL-MCNC: 15.4 MG/DL (ref 8.4–25.7)
CALCIUM SERPL-MCNC: 9.5 MG/DL (ref 8.4–10.2)
CHLORIDE SERPL-SCNC: 100 MMOL/L (ref 98–107)
CHOLEST SERPL-MCNC: 139 MG/DL
CHOLEST/HDLC SERPL: 4 {RATIO} (ref 0–5)
CO2 SERPL-SCNC: 29 MMOL/L (ref 22–29)
CREAT SERPL-MCNC: 1.27 MG/DL (ref 0.73–1.18)
CREAT UR-MCNC: 249.7 MG/DL (ref 63–166)
EOSINOPHIL # BLD AUTO: 0.1 X10(3)/MCL (ref 0–0.9)
EOSINOPHIL NFR BLD AUTO: 1.3 %
ERYTHROCYTE [DISTWIDTH] IN BLOOD BY AUTOMATED COUNT: 12.7 % (ref 11.5–17)
EST. AVERAGE GLUCOSE BLD GHB EST-MCNC: 191.5 MG/DL
GLOBULIN SER-MCNC: 3.3 GM/DL (ref 2.4–3.5)
GLUCOSE SERPL-MCNC: 193 MG/DL (ref 74–100)
HBA1C MFR BLD: 8.3 %
HCT VFR BLD AUTO: 48.8 % (ref 42–52)
HDLC SERPL-MCNC: 34 MG/DL (ref 35–60)
HGB BLD-MCNC: 16.3 GM/DL (ref 14–18)
IMM GRANULOCYTES # BLD AUTO: 0.01 X10(3)/MCL (ref 0–0.02)
IMM GRANULOCYTES NFR BLD AUTO: 0.1 % (ref 0–0.43)
LDLC SERPL CALC-MCNC: 79 MG/DL (ref 50–140)
LYMPHOCYTES # BLD AUTO: 1.74 X10(3)/MCL (ref 0.6–4.6)
LYMPHOCYTES NFR BLD AUTO: 23.3 %
MCH RBC QN AUTO: 30.1 PG (ref 27–31)
MCHC RBC AUTO-ENTMCNC: 33.4 MG/DL (ref 33–36)
MCV RBC AUTO: 90 FL (ref 80–94)
MICROALBUMIN UR-MCNC: 15.7 UG/ML
MICROALBUMIN/CREAT RATIO PNL UR: 6.3 MG/GM CR (ref 0–30)
MONOCYTES # BLD AUTO: 0.55 X10(3)/MCL (ref 0.1–1.3)
MONOCYTES NFR BLD AUTO: 7.4 %
NEUTROPHILS # BLD AUTO: 5 X10(3)/MCL (ref 2.1–9.2)
NEUTROPHILS NFR BLD AUTO: 67 %
NRBC BLD AUTO-RTO: 0 %
PLATELET # BLD AUTO: 258 X10(3)/MCL (ref 130–400)
PMV BLD AUTO: 9.4 FL (ref 9.4–12.4)
POTASSIUM SERPL-SCNC: 4.1 MMOL/L (ref 3.5–5.1)
PROT SERPL-MCNC: 7.4 GM/DL (ref 6.4–8.3)
RBC # BLD AUTO: 5.42 X10(6)/MCL (ref 4.7–6.1)
SODIUM SERPL-SCNC: 137 MMOL/L (ref 136–145)
TRIGL SERPL-MCNC: 128 MG/DL (ref 34–140)
TSH SERPL-ACNC: 2.56 UIU/ML (ref 0.35–4.94)
VLDLC SERPL CALC-MCNC: 26 MG/DL
WBC # SPEC AUTO: 7.5 X10(3)/MCL (ref 4.5–11.5)

## 2022-06-06 PROCEDURE — 92228 IMG RTA DETC/MNTR DS PHY/QHP: CPT | Mod: PBBFAC

## 2022-06-06 PROCEDURE — 2025F 7 FLD RTA PHOTO W/O RTNOPTHY: CPT | Mod: PBBFAC | Performed by: INTERNAL MEDICINE

## 2022-06-06 PROCEDURE — 80053 COMPREHEN METABOLIC PANEL: CPT

## 2022-06-06 PROCEDURE — 99214 OFFICE O/P EST MOD 30 MIN: CPT | Mod: S$PBB,,, | Performed by: NURSE PRACTITIONER

## 2022-06-06 PROCEDURE — 99214 OFFICE O/P EST MOD 30 MIN: CPT | Mod: PBBFAC | Performed by: NURSE PRACTITIONER

## 2022-06-06 PROCEDURE — 85025 COMPLETE CBC W/AUTO DIFF WBC: CPT

## 2022-06-06 PROCEDURE — 36415 COLL VENOUS BLD VENIPUNCTURE: CPT

## 2022-06-06 PROCEDURE — 84443 ASSAY THYROID STIM HORMONE: CPT

## 2022-06-06 PROCEDURE — 99214 PR OFFICE/OUTPT VISIT, EST, LEVL IV, 30-39 MIN: ICD-10-PCS | Mod: S$PBB,,, | Performed by: NURSE PRACTITIONER

## 2022-06-06 PROCEDURE — 83036 HEMOGLOBIN GLYCOSYLATED A1C: CPT

## 2022-06-06 PROCEDURE — 82043 UR ALBUMIN QUANTITATIVE: CPT | Performed by: NURSE PRACTITIONER

## 2022-06-06 PROCEDURE — 80061 LIPID PANEL: CPT

## 2022-06-06 RX ORDER — ATORVASTATIN CALCIUM 10 MG/1
10 TABLET, FILM COATED ORAL NIGHTLY
Qty: 90 TABLET | Refills: 1 | Status: SHIPPED | OUTPATIENT
Start: 2022-06-06 | End: 2022-11-30 | Stop reason: SDUPTHER

## 2022-06-06 RX ORDER — LOSARTAN POTASSIUM 50 MG/1
50 TABLET ORAL DAILY
COMMUNITY
Start: 2022-03-21 | End: 2022-06-06 | Stop reason: SDUPTHER

## 2022-06-06 RX ORDER — ATORVASTATIN CALCIUM 10 MG/1
10 TABLET, FILM COATED ORAL NIGHTLY
COMMUNITY
Start: 2022-03-21 | End: 2022-06-06 | Stop reason: SDUPTHER

## 2022-06-06 RX ORDER — METFORMIN HYDROCHLORIDE 500 MG/1
1000 TABLET, EXTENDED RELEASE ORAL 2 TIMES DAILY
COMMUNITY
Start: 2022-03-21 | End: 2022-06-06 | Stop reason: SDUPTHER

## 2022-06-06 RX ORDER — EMPAGLIFLOZIN 10 MG/1
10 TABLET, FILM COATED ORAL DAILY
Qty: 90 TABLET | Refills: 1 | Status: SHIPPED | OUTPATIENT
Start: 2022-06-06 | End: 2022-11-30

## 2022-06-06 RX ORDER — ASPIRIN 81 MG/1
81 TABLET ORAL DAILY
COMMUNITY
Start: 2022-03-21

## 2022-06-06 RX ORDER — PRENATAL VIT 91/IRON/FOLIC/DHA 28-975-200
COMBINATION PACKAGE (EA) ORAL 2 TIMES DAILY
Qty: 60 G | Refills: 1 | Status: SHIPPED | OUTPATIENT
Start: 2022-06-06 | End: 2023-03-08 | Stop reason: SDUPTHER

## 2022-06-06 RX ORDER — METFORMIN HYDROCHLORIDE 500 MG/1
1000 TABLET, EXTENDED RELEASE ORAL 2 TIMES DAILY
Qty: 360 TABLET | Refills: 2 | Status: SHIPPED | OUTPATIENT
Start: 2022-06-06 | End: 2022-11-30 | Stop reason: SDUPTHER

## 2022-06-06 RX ORDER — AMLODIPINE BESYLATE 10 MG/1
10 TABLET ORAL DAILY
Qty: 90 TABLET | Refills: 1 | Status: SHIPPED | OUTPATIENT
Start: 2022-06-06 | End: 2022-11-30 | Stop reason: SDUPTHER

## 2022-06-06 RX ORDER — AMLODIPINE BESYLATE 10 MG/1
10 TABLET ORAL DAILY
COMMUNITY
Start: 2022-03-21 | End: 2022-06-06 | Stop reason: SDUPTHER

## 2022-06-06 RX ORDER — LOSARTAN POTASSIUM 50 MG/1
50 TABLET ORAL DAILY
Qty: 90 TABLET | Refills: 1 | Status: SHIPPED | OUTPATIENT
Start: 2022-06-06 | End: 2022-11-30 | Stop reason: SDUPTHER

## 2022-06-06 NOTE — ASSESSMENT & PLAN NOTE
A1c 8.3%  Needs new machine and supplies  Compliant with ADA diet  Add Jardiance 10 mg once daily to regimen, continue Metformin 1000 mg BID  DM foot today  DM eye / fundus photos today

## 2022-06-06 NOTE — PROGRESS NOTES
Bruna L Kim, NP   [facility]  [address] [facility] [address]      PATIENT NAME: Taj Ceja  : 1970  DATE: 22  MRN: [id number]      Billing Provider: Bruna Alvarez NP  Level of Service:   Patient PCP Information     Provider PCP Type    Bruna Alvarez NP General          Reason for Visit / Chief Complaint: Follow-up       History of Present Illness / Problem Focused Workflow     Taj Ceja presents to the clinic with Follow-up     53 yo WM for follow up. Last visit 2021. Labs completed this morning. A1c increased to 8.3%. /81. LDL 79. Eating healthier; more fish and vegetables. Not really exercising. Needs new glucometer. Compliant with CPAP. C/o left shoulder muscle pain x few months. Intermittent. Takes OTC Tylenol with relief. Denies any CP, cough, SOB, abdominal pain, n/v/d, bloody stools. Agrees to Cologuard testing. No other concerns.       Review of Systems     Review of Systems   Constitutional: Negative for appetite change, chills, fatigue, fever and unexpected weight change.   HENT: Negative for congestion, ear pain, hearing loss, sinus pressure, sore throat and tinnitus.    Respiratory: Negative for cough, chest tightness, shortness of breath and wheezing.    Cardiovascular: Negative for chest pain, palpitations and leg swelling.   Gastrointestinal: Negative for abdominal distention, abdominal pain, blood in stool, constipation, diarrhea, nausea and vomiting.   Genitourinary: Negative for dysuria and hematuria.   Musculoskeletal: Negative for arthralgias and myalgias.   Skin: Negative for pallor.   Allergic/Immunologic: Negative for environmental allergies.   Neurological: Negative for dizziness, syncope, weakness, numbness and headaches.   Hematological: Negative for adenopathy. Does not bruise/bleed easily.   Psychiatric/Behavioral: Negative for agitation, behavioral problems, confusion,  hallucinations, sleep disturbance and suicidal ideas. The patient is not nervous/anxious.        Medical / Social / Family History   History reviewed. No pertinent past medical history.    Past Surgical History:   Procedure Laterality Date    Denies         Social History    reports that he quit smoking about 3 months ago. His smoking use included cigarettes. He has never used smokeless tobacco. He reports current alcohol use of about 3.0 standard drinks of alcohol per week. He reports previous drug use.    Family History  's family history includes Alcohol abuse in his father; Diabetes in his father; Gout in his father; Hypertension in his father.    Medications and Allergies     Medications  Outpatient Medications Marked as Taking for the 6/6/22 encounter (Office Visit) with Bruna Alvarez NP   Medication Sig Dispense Refill    aspirin (ECOTRIN) 81 MG EC tablet Take 81 mg by mouth once daily.      [DISCONTINUED] amLODIPine (NORVASC) 10 MG tablet Take 10 mg by mouth once daily.      [DISCONTINUED] atorvastatin (LIPITOR) 10 MG tablet Take 10 mg by mouth nightly.      [DISCONTINUED] losartan (COZAAR) 50 MG tablet Take 50 mg by mouth once daily.      [DISCONTINUED] metFORMIN (GLUCOPHAGE-XR) 500 MG ER 24hr tablet Take 1,000 mg by mouth 2 (two) times daily.         Allergies  Review of patient's allergies indicates:  No Known Allergies    Physical Examination     Vitals:    06/06/22 0831   BP: 128/81   Pulse: 81   Resp: 18   Temp: 98.8 °F (37.1 °C)     Physical Exam  Constitutional:       General: He is not in acute distress.     Appearance: Normal appearance. He is not ill-appearing.   HENT:      Head: Normocephalic.      Right Ear: Tympanic membrane, ear canal and external ear normal.      Left Ear: Tympanic membrane, ear canal and external ear normal.      Nose: Nose normal.      Mouth/Throat:      Mouth: Mucous membranes are moist.   Eyes:      Extraocular Movements: Extraocular movements  intact.      Conjunctiva/sclera: Conjunctivae normal.      Pupils: Pupils are equal, round, and reactive to light.   Neck:      Vascular: No carotid bruit.   Cardiovascular:      Rate and Rhythm: Normal rate and regular rhythm.      Pulses: Normal pulses.           Dorsalis pedis pulses are 2+ on the right side and 2+ on the left side.        Posterior tibial pulses are 2+ on the right side and 2+ on the left side.      Heart sounds: Normal heart sounds. No murmur heard.  Pulmonary:      Effort: Pulmonary effort is normal. No respiratory distress.      Breath sounds: Normal breath sounds.   Abdominal:      General: Bowel sounds are normal. There is no distension.      Palpations: Abdomen is soft. There is no mass.      Tenderness: There is no abdominal tenderness. There is no guarding.      Hernia: No hernia is present.   Musculoskeletal:         General: Tenderness (left trapezius tenderness) present. Normal range of motion.      Cervical back: Normal range of motion and neck supple. No tenderness.      Right lower leg: No edema.      Left lower leg: No edema.      Right foot: No deformity or bunion.      Left foot: No deformity or bunion.      Comments: Cervical ROM intact, non painful. Non painful to palpation.    Feet:      Right foot:      Protective Sensation: 5 sites tested. 5 sites sensed.      Skin integrity: Skin integrity normal. No ulcer, blister, skin breakdown, erythema, warmth, callus, dry skin or fissure.      Left foot:      Protective Sensation: 5 sites tested. 5 sites sensed.      Skin integrity: Skin integrity normal. No ulcer, blister, skin breakdown, erythema, warmth, callus, dry skin or fissure.   Lymphadenopathy:      Cervical: No cervical adenopathy.   Skin:     General: Skin is warm and dry.      Capillary Refill: Capillary refill takes less than 2 seconds.   Neurological:      Mental Status: He is alert and oriented to person, place, and time.      Motor: No weakness.      Coordination:  Coordination normal.      Gait: Gait normal.   Psychiatric:         Mood and Affect: Mood normal.         Behavior: Behavior normal.         Thought Content: Thought content normal.         Judgment: Judgment normal.           Results     Lab Results   Component Value Date    WBC 7.5 06/06/2022    RBC 5.42 06/06/2022    HGB 16.3 06/06/2022    HCT 48.8 06/06/2022    MCV 90.0 06/06/2022    MCH 30.1 06/06/2022    MCHC 33.4 06/06/2022    RDW 12.7 06/06/2022     06/06/2022    MPV 9.4 06/06/2022     Sodium Level   Date Value Ref Range Status   06/06/2022 137 136 - 145 mmol/L Final     Potassium Level   Date Value Ref Range Status   06/06/2022 4.1 3.5 - 5.1 mmol/L Final     Carbon Dioxide   Date Value Ref Range Status   06/06/2022 29 22 - 29 mmol/L Final     Blood Urea Nitrogen   Date Value Ref Range Status   06/06/2022 15.4 8.4 - 25.7 mg/dL Final     Creatinine   Date Value Ref Range Status   06/06/2022 1.27 (H) 0.73 - 1.18 mg/dL Final     Calcium Level Total   Date Value Ref Range Status   06/06/2022 9.5 8.4 - 10.2 mg/dL Final     Albumin Level   Date Value Ref Range Status   06/06/2022 4.1 3.5 - 5.0 gm/dL Final     Bilirubin Total   Date Value Ref Range Status   06/06/2022 1.5 <=1.5 mg/dL Final     Alkaline Phosphatase   Date Value Ref Range Status   06/06/2022 98 40 - 150 unit/L Final     Aspartate Aminotransferase   Date Value Ref Range Status   06/06/2022 18 5 - 34 unit/L Final     Alanine Aminotransferase   Date Value Ref Range Status   06/06/2022 34 0 - 55 unit/L Final     Estimated GFR-Non    Date Value Ref Range Status   06/06/2022 >60 mls/min/1.73/m2 Final     Lab Results   Component Value Date    CHOL 139 06/06/2022     Lab Results   Component Value Date    HDL 34 (L) 06/06/2022     No results found for: LDLCALC  Lab Results   Component Value Date    TRIG 128 06/06/2022     No results found for: CHOLHDL  Lab Results   Component Value Date    TSH 2.300 05/25/2020     Lab Results    Component Value Date    PHUR 7.0 05/25/2020    PROTEINUA Negative 05/25/2020    GLUCUA 300 (A) 05/25/2020    KETONESU Negative 05/25/2020    OCCULTUA Negative 05/25/2020    NITRITE Negative 05/25/2020    LEUKOCYTESUR Negative 05/25/2020     Lab Results   Component Value Date    HGBA1C 8.3 (H) 06/06/2022    HGBA1C 6.7 06/02/2021    HGBA1C 7.1 (H) 05/25/2020     No results found for: MICROALBUR, [id number]   No results found for this or any previous visit.         Assessment and Plan (including Health Maintenance)     Plan:         Health Maintenance Due   Topic Date Due    COVID-19 Vaccine (1) Never done    Eye Exam  Never done    Colorectal Cancer Screening  Never done    Shingles Vaccine (1 of 2) Never done    Diabetes Urine Screening  06/02/2022       Problem List Items Addressed This Visit        Derm    Tinea pedis    Current Assessment & Plan     Right foot with tinea pedis  Keep feet clean and dry, change socks often when wearing closed toe shoes, wear open toe shoes when available   Apply cream to affected area               Cardiac/Vascular    Hyperlipidemia    Overview     Avoid tobacco/ alcohol  Follow low fat/low cholesterol diet such as avoid/ decrease finnegan, sausage, fried foods, cookies, cakes, chips, cheese, whole milk, butter, mayonnaise. Add olive oil, avocados, lean meats, fresh fruits/ vegetables, heart healthy nuts to diet.  Educated on health benefits of exercise 5 days/ week of at least 30 minutes moderate intensity exercise (brisk walking) and 2 days/ week of muscle strength activities  Daily ASA 81 mg for CV prevention  Continue current medication regimen             Current Assessment & Plan     LDL 79, Trig 128, HDL 34 ,              Hypertension    Overview     Follow low sodium diet, < 2 gm/day (avoid high salty foods such as processed meats/ sausage/finnegan/ sandwich meat, chips, pickles, cheese, crackers and soft drinks/ electrolyte replacement drinks).  Avoid tobacco/ alcohol  use  Educated on health benefits of at least 5 days/ week of 30 minutes moderate intensity exercise (brisk walking) and 2 or more days/ week of muscle strength activities  Daily ASA 81 mg for CV prevention  Continue current medication regimen             Current Assessment & Plan     /81              Endocrine    Obesity    Overview     BMI 33.49  Educated on increased risk of disease s/t obesity.  Educated on health benefits of at least 5 days/ week of 30 minutes moderate intensity exercise (brisk walking) and 2 or more days/ week of muscle strength activities (as tolerated).  Eat well balanced diet of fresh fruits/ vegetables, whole grains, lean meats and limit high carbohydrate foods.              Current Assessment & Plan     Wt gain 4# since last visit June 2021           Type 2 diabetes mellitus - Primary    Overview     Educated on ADA diet:  1. Avoid/ decrease high carbohydrate foods (rice, pasta, bread, candy, sweets, carbonated beverages)  Educated on health benefits of at least 5 days/ week of 30 minutes moderate intensity exercise (brisk walking) and 2 or more days/ week of muscle strength activities  Avoid alcohol or tobacco use if applicable  Eye exam: 6/6/21  Foot exam: June 2, 2021  Continue ASA, statin, ARB              Current Assessment & Plan     A1c 8.3%  Needs new machine and supplies  Compliant with ADA diet  Add Jardiance 10 mg once daily to regimen, continue Metformin 1000 mg BID  DM foot today  DM eye / fundus photos today            Relevant Medications    metFORMIN (GLUCOPHAGE-XR) 500 MG ER 24hr tablet    Other Relevant Orders    DIabetic retinopathy screening    MICROALBUMIN / CREATININE RATIO URINE    DIABETIC SUPPLIES FOR HOME USE    Comprehensive Metabolic Panel    Hemoglobin A1C       Orthopedic    Cervical muscle pain    Current Assessment & Plan     C/o left trapezius muscle pain/ contraction x few months without cervical pain/ radiculopathy  Taking OTC Tylenol with relief    Pain intermittent   Declined XR cervical spine today               Other    Obstructive sleep apnea syndrome    Overview     Compliant with CPAP, benefiting from use, and needs to continue with use of CPAP.  Avoid driving while drowsy.  Regular exercise as tolerated for lower BMI.              Tobacco user    Overview     Quit tobacco March 2022. Currently vaping.           Current Assessment & Plan     Encouraged cessation             Other Visit Diagnoses     Colon cancer screening        Relevant Orders    Cologuard Screening (Multitarget Stool DNA)          Health Maintenance Topics with due status: Not Due       Topic Last Completion Date    TETANUS VACCINE 06/02/2021    Low Dose Statin 06/06/2022    Foot Exam 06/06/2022    Lipid Panel 06/06/2022    Hemoglobin A1c 06/06/2022    Influenza Vaccine Not Due       Future Appointments   Date Time Provider Department Center   6/6/2022  9:15 AM DIABETIC EYE [facility], [facility]IC[facility] RESIDENTS [facility] [place]   9/6/2022 12:00 PM Bruna Alvarez NP [facility] [employer]            Signature:  Bruna Alvarez NP  [facility]  [address] [facility] [address]    Date of encounter: 6/6/22

## 2022-06-06 NOTE — ASSESSMENT & PLAN NOTE
Right foot with tinea pedis  Keep feet clean and dry, change socks often when wearing closed toe shoes, wear open toe shoes when available   Apply cream to affected area

## 2022-06-06 NOTE — ASSESSMENT & PLAN NOTE
C/o left trapezius muscle pain/ contraction x few months without cervical pain/ radiculopathy  Taking OTC Tylenol with relief   Pain intermittent   Declined XR cervical spine today

## 2022-06-07 ENCOUNTER — TELEPHONE (OUTPATIENT)
Dept: INTERNAL MEDICINE | Facility: CLINIC | Age: 52
End: 2022-06-07

## 2022-06-07 DIAGNOSIS — E11.9 TYPE 2 DIABETES MELLITUS WITHOUT COMPLICATION, WITHOUT LONG-TERM CURRENT USE OF INSULIN: Primary | ICD-10-CM

## 2022-06-07 NOTE — TELEPHONE ENCOUNTER
Can you please reorder the Diabetic eye exam that was ordered at yesterday's appt.  The order should be Diabetic Eye Screening Photo.  Order prepped, please sign.

## 2022-06-08 NOTE — PROGRESS NOTES
Taj Ceja is a 52 y.o. male here for a diabetic eye screening with non-dilated fundus photos per JENNY Alvarez NP.    Patient cooperative?: Yes  Small pupils?: No  Last eye exam: unknown    For exam results, see Encounter Report.

## 2022-06-09 ENCOUNTER — TELEPHONE (OUTPATIENT)
Dept: INTERNAL MEDICINE | Facility: CLINIC | Age: 52
End: 2022-06-09

## 2022-06-09 NOTE — TELEPHONE ENCOUNTER
Please let patient know diabetic eye results reviewed and were normal. We will recheck in a year for screening.     Thank you

## 2022-07-12 RX ORDER — ATORVASTATIN CALCIUM 10 MG/1
10 TABLET, FILM COATED ORAL NIGHTLY
Qty: 90 TABLET | Refills: 1 | OUTPATIENT
Start: 2022-07-12

## 2022-07-12 RX ORDER — AMLODIPINE BESYLATE 10 MG/1
10 TABLET ORAL DAILY
Qty: 90 TABLET | Refills: 1 | OUTPATIENT
Start: 2022-07-12

## 2022-07-12 RX ORDER — METFORMIN HYDROCHLORIDE 500 MG/1
1000 TABLET, EXTENDED RELEASE ORAL 2 TIMES DAILY
Qty: 360 TABLET | Refills: 2 | OUTPATIENT
Start: 2022-07-12

## 2022-07-12 RX ORDER — LOSARTAN POTASSIUM 50 MG/1
50 TABLET ORAL DAILY
Qty: 90 TABLET | Refills: 1 | OUTPATIENT
Start: 2022-07-12

## 2022-08-26 ENCOUNTER — PATIENT MESSAGE (OUTPATIENT)
Dept: INTERNAL MEDICINE | Facility: CLINIC | Age: 52
End: 2022-08-26

## 2022-08-28 RX ORDER — EMPAGLIFLOZIN 10 MG/1
10 TABLET, FILM COATED ORAL DAILY
Qty: 90 TABLET | Refills: 1 | Status: CANCELLED | OUTPATIENT
Start: 2022-08-28

## 2022-11-09 DIAGNOSIS — E11.9 TYPE 2 DIABETES MELLITUS WITHOUT COMPLICATION, WITHOUT LONG-TERM CURRENT USE OF INSULIN: Primary | ICD-10-CM

## 2022-11-30 ENCOUNTER — OFFICE VISIT (OUTPATIENT)
Dept: INTERNAL MEDICINE | Facility: CLINIC | Age: 52
End: 2022-11-30

## 2022-11-30 VITALS
HEART RATE: 82 BPM | SYSTOLIC BLOOD PRESSURE: 126 MMHG | RESPIRATION RATE: 20 BRPM | BODY MASS INDEX: 33.84 KG/M2 | WEIGHT: 215.63 LBS | DIASTOLIC BLOOD PRESSURE: 79 MMHG | TEMPERATURE: 98 F | HEIGHT: 67 IN

## 2022-11-30 DIAGNOSIS — Z12.5 PROSTATE CANCER SCREENING: ICD-10-CM

## 2022-11-30 DIAGNOSIS — E66.9 OBESITY, UNSPECIFIED CLASSIFICATION, UNSPECIFIED OBESITY TYPE, UNSPECIFIED WHETHER SERIOUS COMORBIDITY PRESENT: ICD-10-CM

## 2022-11-30 DIAGNOSIS — Z12.11 COLON CANCER SCREENING: ICD-10-CM

## 2022-11-30 DIAGNOSIS — G47.33 OBSTRUCTIVE SLEEP APNEA SYNDROME: ICD-10-CM

## 2022-11-30 DIAGNOSIS — E11.9 TYPE 2 DIABETES MELLITUS WITHOUT COMPLICATION, WITHOUT LONG-TERM CURRENT USE OF INSULIN: Primary | ICD-10-CM

## 2022-11-30 DIAGNOSIS — E78.5 HYPERLIPIDEMIA, UNSPECIFIED HYPERLIPIDEMIA TYPE: ICD-10-CM

## 2022-11-30 DIAGNOSIS — I10 HYPERTENSION, UNSPECIFIED TYPE: ICD-10-CM

## 2022-11-30 PROCEDURE — 99214 OFFICE O/P EST MOD 30 MIN: CPT | Mod: S$PBB,,, | Performed by: NURSE PRACTITIONER

## 2022-11-30 PROCEDURE — 99214 OFFICE O/P EST MOD 30 MIN: CPT | Mod: PBBFAC | Performed by: NURSE PRACTITIONER

## 2022-11-30 PROCEDURE — 99214 PR OFFICE/OUTPT VISIT, EST, LEVL IV, 30-39 MIN: ICD-10-PCS | Mod: S$PBB,,, | Performed by: NURSE PRACTITIONER

## 2022-11-30 RX ORDER — LOSARTAN POTASSIUM 50 MG/1
50 TABLET ORAL DAILY
Qty: 90 TABLET | Refills: 2 | Status: SHIPPED | OUTPATIENT
Start: 2022-11-30 | End: 2023-03-08 | Stop reason: SDUPTHER

## 2022-11-30 RX ORDER — ATORVASTATIN CALCIUM 10 MG/1
10 TABLET, FILM COATED ORAL NIGHTLY
Qty: 90 TABLET | Refills: 2 | Status: SHIPPED | OUTPATIENT
Start: 2022-11-30 | End: 2023-03-08 | Stop reason: SDUPTHER

## 2022-11-30 RX ORDER — METFORMIN HYDROCHLORIDE 500 MG/1
1000 TABLET, EXTENDED RELEASE ORAL 2 TIMES DAILY
Qty: 360 TABLET | Refills: 2 | Status: SHIPPED | OUTPATIENT
Start: 2022-11-30 | End: 2023-03-08 | Stop reason: SDUPTHER

## 2022-11-30 RX ORDER — AMLODIPINE BESYLATE 10 MG/1
10 TABLET ORAL DAILY
Qty: 90 TABLET | Refills: 2 | Status: SHIPPED | OUTPATIENT
Start: 2022-11-30 | End: 2023-03-08 | Stop reason: SDUPTHER

## 2022-11-30 NOTE — PROGRESS NOTES
Bruna L Kim, NP   [facility]  [address] [address]      PATIENT NAME: Taj Ceja  : 1970  DATE: 22  MRN: [id number]      Billing Provider: Bruna Alvarez NP  Level of Service: VT OFFICE/OUTPT VISIT, EST, LEVL IV, 30-39 MIN  Patient PCP Information       Provider PCP Type    Bruna Alvarez NP General            Reason for Visit / Chief Complaint: Follow-up (Lab results)       History of Present Illness / Problem Focused Workflow     Taj Ceja presents to the clinic with Follow-up (Lab results)     51 yo WM for follow up and lab results. PMH includes HTN, HLD, DM, left ear hearing loss, tinnitus, CHRISTIAN on CPAP, obesity, tobacco abuse. /79. Labs reviewed with improved A1c 7.6%. Does mention he noticed genital area a little red/ irritated but denies any itching or bothersome symptoms. Asking about carbs vs added sugars on nutrition labels. Avoiding added sugars. Admits to drinking diet soft drinks and trying to drink more water. Compliant with CPAP. Refuses vaccines. No other concerns.      Review of Systems     Review of Systems   Constitutional:  Negative for appetite change, chills, fatigue, fever and unexpected weight change.   HENT:  Negative for congestion, ear pain, hearing loss, sinus pressure, sore throat and tinnitus.    Respiratory:  Negative for cough, chest tightness, shortness of breath and wheezing.    Cardiovascular:  Negative for chest pain, palpitations and leg swelling.   Gastrointestinal:  Negative for abdominal distention, abdominal pain, blood in stool, constipation, diarrhea, nausea and vomiting.   Genitourinary:  Negative for dysuria and hematuria.   Musculoskeletal:  Negative for arthralgias and myalgias.   Skin:  Negative for pallor.   Allergic/Immunologic: Negative for environmental allergies.   Neurological:  Negative for dizziness, syncope, weakness, numbness and headaches.    Hematological:  Negative for adenopathy. Does not bruise/bleed easily.   Psychiatric/Behavioral:  Negative for agitation, behavioral problems, confusion, hallucinations, sleep disturbance and suicidal ideas. The patient is not nervous/anxious.      Medical / Social / Family History   History reviewed. No pertinent past medical history.    Past Surgical History:   Procedure Laterality Date    Denies         Social History  Mr. Vang  reports that he has been smoking cigarettes and vaping with nicotine. He has never used smokeless tobacco. He reports current alcohol use of about 3.0 standard drinks per week. He reports that he does not currently use drugs.    Family History  Mr. Vang's family history includes Alcohol abuse in his father; Diabetes in his father; Gout in his father; Hypertension in his father.    Medications and Allergies     Medications  Medication List with Changes/Refills   New Medications    EMPAGLIFLOZIN (JARDIANCE) 25 MG TABLET    Take 1 tablet (25 mg total) by mouth once daily.   Current Medications    ASPIRIN (ECOTRIN) 81 MG EC TABLET    Take 81 mg by mouth once daily.    TERBINAFINE HCL (LAMISIL AT) 1 % CREAM    Apply topically 2 (two) times daily.   Changed and/or Refilled Medications    Modified Medication Previous Medication    AMLODIPINE (NORVASC) 10 MG TABLET amLODIPine (NORVASC) 10 MG tablet       Take 1 tablet (10 mg total) by mouth once daily.    Take 1 tablet (10 mg total) by mouth once daily.    ATORVASTATIN (LIPITOR) 10 MG TABLET atorvastatin (LIPITOR) 10 MG tablet       Take 1 tablet (10 mg total) by mouth nightly.    Take 1 tablet (10 mg total) by mouth nightly.    LOSARTAN (COZAAR) 50 MG TABLET losartan (COZAAR) 50 MG tablet       Take 1 tablet (50 mg total) by mouth once daily.    Take 1 tablet (50 mg total) by mouth once daily.    METFORMIN (GLUCOPHAGE-XR) 500 MG ER 24HR TABLET metFORMIN (GLUCOPHAGE-XR) 500 MG ER 24hr tablet       Take 2 tablets (1,000 mg total) by mouth 2  (two) times daily.    Take 2 tablets (1,000 mg total) by mouth 2 (two) times daily.   Discontinued Medications    EMPAGLIFLOZIN (JARDIANCE) 10 MG TABLET    Take 1 tablet (10 mg total) by mouth once daily.           Allergies  Review of patient's allergies indicates:  No Known Allergies    Physical Examination     Vitals:    11/30/22 1230   BP: 126/79   Pulse: 82   Resp: 20   Temp: 98.2 °F (36.8 °C)     Physical Exam  Vitals and nursing note reviewed.   Constitutional:       Appearance: Normal appearance. He is not ill-appearing.   HENT:      Head: Normocephalic.   Neck:      Vascular: No carotid bruit.   Cardiovascular:      Rate and Rhythm: Normal rate and regular rhythm.      Pulses: Normal pulses.   Pulmonary:      Effort: Pulmonary effort is normal. No respiratory distress.      Breath sounds: Normal breath sounds.   Musculoskeletal:         General: Normal range of motion.      Cervical back: Normal range of motion and neck supple. No tenderness.      Right lower leg: No edema.      Left lower leg: No edema.   Lymphadenopathy:      Cervical: No cervical adenopathy.   Skin:     General: Skin is warm and dry.      Capillary Refill: Capillary refill takes less than 2 seconds.   Neurological:      Mental Status: He is alert and oriented to person, place, and time. Mental status is at baseline.      Motor: No weakness.   Psychiatric:         Mood and Affect: Mood normal.         Behavior: Behavior normal.         Thought Content: Thought content normal.         Judgment: Judgment normal.         Results     Lab Results   Component Value Date    WBC 7.5 06/06/2022    RBC 5.42 06/06/2022    HGB 16.3 06/06/2022    HCT 48.8 06/06/2022    MCV 90.0 06/06/2022    MCH 30.1 06/06/2022    MCHC 33.4 06/06/2022    RDW 12.7 06/06/2022     06/06/2022    MPV 9.4 06/06/2022     Sodium Level   Date Value Ref Range Status   11/30/2022 135 (L) 136 - 145 mmol/L Final     Potassium Level   Date Value Ref Range Status   11/30/2022  4.4 3.5 - 5.1 mmol/L Final     Carbon Dioxide   Date Value Ref Range Status   11/30/2022 24 22 - 29 mmol/L Final     Blood Urea Nitrogen   Date Value Ref Range Status   11/30/2022 18.4 8.4 - 25.7 mg/dL Final     Creatinine   Date Value Ref Range Status   11/30/2022 1.23 (H) 0.73 - 1.18 mg/dL Final     Calcium Level Total   Date Value Ref Range Status   11/30/2022 9.8 8.4 - 10.2 mg/dL Final     Albumin Level   Date Value Ref Range Status   11/30/2022 4.2 3.5 - 5.0 gm/dL Final     Bilirubin Total   Date Value Ref Range Status   11/30/2022 2.0 (H) <=1.5 mg/dL Final     Alkaline Phosphatase   Date Value Ref Range Status   11/30/2022 96 40 - 150 unit/L Final     Aspartate Aminotransferase   Date Value Ref Range Status   11/30/2022 18 5 - 34 unit/L Final     Alanine Aminotransferase   Date Value Ref Range Status   11/30/2022 23 0 - 55 unit/L Final     Estimated GFR-Non    Date Value Ref Range Status   06/06/2022 >60 mls/min/1.73/m2 Final     Lab Results   Component Value Date    CHOL 139 06/06/2022     Lab Results   Component Value Date    HDL 34 (L) 06/06/2022     No results found for: LDLCALC  Lab Results   Component Value Date    TRIG 128 06/06/2022     No results found for: CHOLHDL  Lab Results   Component Value Date    TSH 2.5635 06/06/2022     Lab Results   Component Value Date    PHUR 7.0 05/25/2020    PROTEINUA Negative 05/25/2020    GLUCUA 300 (A) 05/25/2020    KETONESU Negative 05/25/2020    OCCULTUA Negative 05/25/2020    NITRITE Negative 05/25/2020    LEUKOCYTESUR Negative 05/25/2020     Lab Results   Component Value Date    HGBA1C 7.6 (H) 11/30/2022    HGBA1C 8.3 (H) 06/06/2022    HGBA1C 6.7 06/02/2021     No results found for: MICROALBUR, [id number]   Results for orders placed in visit on 06/06/22    Diabetic Eye Screening Photo         Assessment and Plan (including Health Maintenance)     Plan:         Health Maintenance Due   Topic Date Due    COVID-19 Vaccine (1) Never done    Colorectal  Cancer Screening  Never done       Problem List Items Addressed This Visit          Cardiac/Vascular    Hyperlipidemia    Current Assessment & Plan     LDL 79, Trig 128, HLD 34, Total 139 June 2022  Avoid tobacco/ alcohol  Follow low fat/low cholesterol diet such as avoid/ decrease finnegan, sausage, fried foods, cookies, cakes, chips, cheese, whole milk, butter, mayonnaise. Add olive oil, avocados, lean meats, fresh fruits/ vegetables, heart healthy nuts to diet.  Educated on health benefits of exercise 5 days/ week of at least 30 minutes moderate intensity exercise (brisk walking) and 2 days/ week of muscle strength activities  Daily ASA 81 mg for CV prevention  Continue current medication regimen           Relevant Orders    Comprehensive Metabolic Panel    Hemoglobin A1C    Lipid Panel    Hypertension    Current Assessment & Plan     /79  Follow low sodium diet, < 2 gm/day (avoid high salty foods such as processed meats/ sausage/finnegan/ sandwich meat, chips, pickles, cheese, crackers and soft drinks/ electrolyte replacement drinks).  Avoid tobacco/ alcohol use  Educated on health benefits of at least 5 days/ week of 30 minutes moderate intensity exercise (brisk walking) and 2 or more days/ week of muscle strength activities  Daily ASA 81 mg for CV prevention  Continue current medication regimen           Relevant Orders    Comprehensive Metabolic Panel    Hemoglobin A1C    Lipid Panel       Endocrine    Obesity    Current Assessment & Plan     BMI 33.77  Educated on increased risk of disease s/t obesity.  Educated on health benefits of at least 5 days/ week of 30 minutes moderate intensity exercise (brisk walking) and 2 or more days/ week of muscle strength activities (as tolerated).  Eat well balanced diet of fresh fruits/ vegetables, whole grains, lean meats and limit high carbohydrate foods.            Type 2 diabetes mellitus - Primary    Current Assessment & Plan     A1c 7.6%, Prior A1c 8.3%  CBGs- lowest  140, highest 230   Educated on ADA diet:  1. Avoid/ decrease high carbohydrate foods (rice, pasta, bread, candy, sweets, carbonated beverages)  Educated on health benefits of at least 5 days/ week of 30 minutes moderate intensity exercise (brisk walking) and 2 or more days/ week of muscle strength activities  Avoid alcohol or tobacco use if applicable  Eye exam: 6/6/22  Foot exam: 6/6/22  Continue ASA, statin, ARB   Discussed ADA diet in detail  Increase Jardiance to 25 mg         Relevant Medications    metFORMIN (GLUCOPHAGE-XR) 500 MG ER 24hr tablet    Other Relevant Orders    Comprehensive Metabolic Panel    Hemoglobin A1C    Lipid Panel       Other    Obstructive sleep apnea syndrome    Current Assessment & Plan     Compliant with CPAP, benefiting from use, and needs to continue with use of CPAP.  Avoid driving while drowsy.  Regular exercise as tolerated for lower BMI.             Other Visit Diagnoses       Colon cancer screening        Relevant Orders    OCCULT BLOOD FECAL IMMUNOASSAY    Prostate cancer screening        Relevant Orders    PSA, Screening            Health Maintenance Topics with due status: Not Due       Topic Last Completion Date    TETANUS VACCINE 06/02/2021    Foot Exam 06/06/2022    Lipid Panel 06/06/2022    Eye Exam 06/06/2022    High Dose Statin 11/30/2022    Diabetes Urine Screening 11/30/2022    Hemoglobin A1c 11/30/2022       Future Appointments   Date Time Provider Department Center   [facility]2023 [facility]   5/22/2023 12:15 PM Bruna Alvarez [facility]      Follow up 3 months with labs.      Signature:  Bruna Alvarez NP  [facility]  [address] [facility] [address]    Date of encounter: 11/30/22

## 2022-11-30 NOTE — ASSESSMENT & PLAN NOTE
LDL 79, Trig 128, HLD 34, Total 139 June 2022  Avoid tobacco/ alcohol  Follow low fat/low cholesterol diet such as avoid/ decrease finnegan, sausage, fried foods, cookies, cakes, chips, cheese, whole milk, butter, mayonnaise. Add olive oil, avocados, lean meats, fresh fruits/ vegetables, heart healthy nuts to diet.  Educated on health benefits of exercise 5 days/ week of at least 30 minutes moderate intensity exercise (brisk walking) and 2 days/ week of muscle strength activities  Daily ASA 81 mg for CV prevention  Continue current medication regimen

## 2022-11-30 NOTE — ASSESSMENT & PLAN NOTE
/79  Follow low sodium diet, < 2 gm/day (avoid high salty foods such as processed meats/ sausage/finnegan/ sandwich meat, chips, pickles, cheese, crackers and soft drinks/ electrolyte replacement drinks).  Avoid tobacco/ alcohol use  Educated on health benefits of at least 5 days/ week of 30 minutes moderate intensity exercise (brisk walking) and 2 or more days/ week of muscle strength activities  Daily ASA 81 mg for CV prevention  Continue current medication regimen

## 2022-11-30 NOTE — ASSESSMENT & PLAN NOTE
A1c 7.6%, Prior A1c 8.3%  CBGs- lowest 140, highest 230   Educated on ADA diet:  1. Avoid/ decrease high carbohydrate foods (rice, pasta, bread, candy, sweets, carbonated beverages)  Educated on health benefits of at least 5 days/ week of 30 minutes moderate intensity exercise (brisk walking) and 2 or more days/ week of muscle strength activities  Avoid alcohol or tobacco use if applicable  Eye exam: 6/6/22  Foot exam: 6/6/22  Continue ASA, statin, ARB   Discussed ADA diet in detail  Increase Jardiance to 25 mg

## 2022-11-30 NOTE — ASSESSMENT & PLAN NOTE
BMI 33.77  Educated on increased risk of disease s/t obesity.  Educated on health benefits of at least 5 days/ week of 30 minutes moderate intensity exercise (brisk walking) and 2 or more days/ week of muscle strength activities (as tolerated).  Eat well balanced diet of fresh fruits/ vegetables, whole grains, lean meats and limit high carbohydrate foods.

## 2023-02-08 ENCOUNTER — PATIENT MESSAGE (OUTPATIENT)
Dept: ADMINISTRATIVE | Facility: HOSPITAL | Age: 53
End: 2023-02-08

## 2023-03-08 ENCOUNTER — PATIENT MESSAGE (OUTPATIENT)
Dept: INTERNAL MEDICINE | Facility: CLINIC | Age: 53
End: 2023-03-08

## 2023-06-19 ENCOUNTER — PATIENT MESSAGE (OUTPATIENT)
Dept: ADMINISTRATIVE | Facility: HOSPITAL | Age: 53
End: 2023-06-19

## 2023-07-10 RX ORDER — ATORVASTATIN CALCIUM 10 MG/1
10 TABLET, FILM COATED ORAL NIGHTLY
Qty: 90 TABLET | Refills: 0 | OUTPATIENT
Start: 2023-07-10

## 2023-07-10 RX ORDER — LOSARTAN POTASSIUM 50 MG/1
50 TABLET ORAL DAILY
Qty: 90 TABLET | Refills: 0 | OUTPATIENT
Start: 2023-07-10

## 2023-07-10 RX ORDER — AMLODIPINE BESYLATE 10 MG/1
10 TABLET ORAL DAILY
Qty: 90 TABLET | Refills: 0 | OUTPATIENT
Start: 2023-07-10

## 2023-07-10 RX ORDER — METFORMIN HYDROCHLORIDE 500 MG/1
1000 TABLET, EXTENDED RELEASE ORAL 2 TIMES DAILY
Qty: 360 TABLET | Refills: 0 | OUTPATIENT
Start: 2023-07-10 | End: 2023-10-08

## 2023-07-12 ENCOUNTER — PATIENT MESSAGE (OUTPATIENT)
Dept: INTERNAL MEDICINE | Facility: CLINIC | Age: 53
End: 2023-07-12

## 2023-07-13 NOTE — TELEPHONE ENCOUNTER
Pt medications was refused because he needs an appointment. Pt appointment is not until 09/05. Please advise

## 2023-07-17 ENCOUNTER — TELEPHONE (OUTPATIENT)
Dept: INTERNAL MEDICINE | Facility: CLINIC | Age: 53
End: 2023-07-17

## 2023-07-17 DIAGNOSIS — E78.2 MIXED HYPERLIPIDEMIA: ICD-10-CM

## 2023-07-17 DIAGNOSIS — E11.9 TYPE 2 DIABETES MELLITUS WITHOUT COMPLICATION, WITHOUT LONG-TERM CURRENT USE OF INSULIN: Primary | ICD-10-CM

## 2023-07-17 DIAGNOSIS — I10 HYPERTENSION, UNSPECIFIED TYPE: ICD-10-CM

## 2023-07-17 RX ORDER — METFORMIN HYDROCHLORIDE 500 MG/1
1000 TABLET, EXTENDED RELEASE ORAL 2 TIMES DAILY
Qty: 120 TABLET | Refills: 1 | Status: SHIPPED | OUTPATIENT
Start: 2023-07-17 | End: 2023-08-24 | Stop reason: SDUPTHER

## 2023-07-17 RX ORDER — ATORVASTATIN CALCIUM 10 MG/1
10 TABLET, FILM COATED ORAL NIGHTLY
Qty: 30 TABLET | Refills: 1 | Status: SHIPPED | OUTPATIENT
Start: 2023-07-17 | End: 2023-08-24 | Stop reason: SDUPTHER

## 2023-07-17 RX ORDER — AMLODIPINE BESYLATE 10 MG/1
10 TABLET ORAL DAILY
Qty: 30 TABLET | Refills: 1 | Status: SHIPPED | OUTPATIENT
Start: 2023-07-17 | End: 2023-08-24 | Stop reason: SDUPTHER

## 2023-07-17 RX ORDER — LOSARTAN POTASSIUM 50 MG/1
50 TABLET ORAL DAILY
Qty: 30 TABLET | Refills: 1 | Status: SHIPPED | OUTPATIENT
Start: 2023-07-17 | End: 2023-08-24 | Stop reason: SDUPTHER

## 2023-07-25 ENCOUNTER — PATIENT MESSAGE (OUTPATIENT)
Dept: ADMINISTRATIVE | Facility: HOSPITAL | Age: 53
End: 2023-07-25

## 2023-07-25 ENCOUNTER — PATIENT MESSAGE (OUTPATIENT)
Dept: INTERNAL MEDICINE | Facility: CLINIC | Age: 53
End: 2023-07-25

## 2023-08-24 ENCOUNTER — OFFICE VISIT (OUTPATIENT)
Dept: INTERNAL MEDICINE | Facility: CLINIC | Age: 53
End: 2023-08-24

## 2023-08-24 ENCOUNTER — CLINICAL SUPPORT (OUTPATIENT)
Dept: INTERNAL MEDICINE | Facility: CLINIC | Age: 53
End: 2023-08-24
Attending: NURSE PRACTITIONER

## 2023-08-24 VITALS
BODY MASS INDEX: 32.24 KG/M2 | HEART RATE: 86 BPM | RESPIRATION RATE: 20 BRPM | HEIGHT: 67 IN | TEMPERATURE: 98 F | WEIGHT: 205.38 LBS | DIASTOLIC BLOOD PRESSURE: 74 MMHG | SYSTOLIC BLOOD PRESSURE: 113 MMHG

## 2023-08-24 DIAGNOSIS — E78.2 MIXED HYPERLIPIDEMIA: ICD-10-CM

## 2023-08-24 DIAGNOSIS — Z12.11 COLON CANCER SCREENING: ICD-10-CM

## 2023-08-24 DIAGNOSIS — E11.9 TYPE 2 DIABETES MELLITUS WITHOUT COMPLICATION, WITHOUT LONG-TERM CURRENT USE OF INSULIN: ICD-10-CM

## 2023-08-24 DIAGNOSIS — G47.33 OBSTRUCTIVE SLEEP APNEA SYNDROME: ICD-10-CM

## 2023-08-24 DIAGNOSIS — Z00.00 WELLNESS EXAMINATION: Primary | ICD-10-CM

## 2023-08-24 DIAGNOSIS — I10 HYPERTENSION, UNSPECIFIED TYPE: ICD-10-CM

## 2023-08-24 LAB
LEFT EYE DM RETINOPATHY: NEGATIVE
RIGHT EYE DM RETINOPATHY: NEGATIVE

## 2023-08-24 PROCEDURE — 99214 OFFICE O/P EST MOD 30 MIN: CPT | Mod: PBBFAC | Performed by: NURSE PRACTITIONER

## 2023-08-24 PROCEDURE — 99212 OFFICE O/P EST SF 10 MIN: CPT | Mod: S$PBB,,, | Performed by: NURSE PRACTITIONER

## 2023-08-24 PROCEDURE — 92228 IMG RTA DETC/MNTR DS PHY/QHP: CPT | Mod: PBBFAC

## 2023-08-24 PROCEDURE — 99212 PR OFFICE/OUTPT VISIT, EST, LEVL II, 10-19 MIN: ICD-10-PCS | Mod: S$PBB,,, | Performed by: NURSE PRACTITIONER

## 2023-08-24 RX ORDER — METFORMIN HYDROCHLORIDE 500 MG/1
1000 TABLET, EXTENDED RELEASE ORAL 2 TIMES DAILY
Qty: 360 TABLET | Refills: 2 | Status: SHIPPED | OUTPATIENT
Start: 2023-08-24 | End: 2024-03-19 | Stop reason: SDUPTHER

## 2023-08-24 RX ORDER — LOSARTAN POTASSIUM 50 MG/1
50 TABLET ORAL DAILY
Qty: 90 TABLET | Refills: 2 | Status: SHIPPED | OUTPATIENT
Start: 2023-08-24 | End: 2024-03-19 | Stop reason: SDUPTHER

## 2023-08-24 RX ORDER — AMLODIPINE BESYLATE 10 MG/1
10 TABLET ORAL DAILY
Qty: 90 TABLET | Refills: 2 | Status: SHIPPED | OUTPATIENT
Start: 2023-08-24 | End: 2024-03-19 | Stop reason: SDUPTHER

## 2023-08-24 RX ORDER — ATORVASTATIN CALCIUM 10 MG/1
10 TABLET, FILM COATED ORAL NIGHTLY
Qty: 90 TABLET | Refills: 2 | Status: SHIPPED | OUTPATIENT
Start: 2023-08-24 | End: 2024-03-19 | Stop reason: SDUPTHER

## 2023-08-24 NOTE — PROGRESS NOTES
Bruna L Kim, NP   [facility]  [address] [facility] [address]      PATIENT NAME: Taj Ceja  : 1970  DATE: 23  MRN: [id number]        Reason for Visit / Chief Complaint: Results       History of Present Illness / Problem Focused Workflow     Taj Ceja presents to the clinic with Results     54 yo WM for follow up/ lab results. Last visit 2022. PMH includes HTN, HLD, DM, left ear hearing loss, hearing aids, tinnitus, CHRISTIAN on CPAP, obesity, history of tobacco abuse. He reports feeling well overall. He denies any complaints except needs medication refills. CRC screening due. Negative family history or prior colonoscopy. He denies any fever, chills, night sweats, LAD, HA, dizziness, cough, SOB, CP, abdominal pain, n/v/d, hematochezia, hematuria, lower extremity swelling/ pain/ numbness.     /74. Labs completed. LDL 58. A1c 7.3%. CBG around 106-120s. He is compliant with CPAP. He is due for DM eye/ foot exam today. Requests referral for dietician to assist with DM/ HLD diet. He denies any other concerns/ complaints.             Review of Systems     Review of Systems   Constitutional:  Negative for appetite change, chills, fatigue, fever and unexpected weight change.   HENT:  Negative for congestion, ear pain, hearing loss, sinus pressure, sore throat and tinnitus.    Respiratory:  Negative for cough, chest tightness, shortness of breath and wheezing.    Cardiovascular:  Negative for chest pain, palpitations and leg swelling.   Gastrointestinal:  Negative for abdominal distention, abdominal pain, blood in stool, constipation, diarrhea, nausea and vomiting.   Genitourinary:  Negative for dysuria and hematuria.   Musculoskeletal:  Negative for arthralgias and myalgias.   Skin:  Negative for pallor.   Allergic/Immunologic: Negative for environmental allergies.   Neurological:  Negative for dizziness, syncope, weakness, numbness  and headaches.   Hematological:  Negative for adenopathy. Does not bruise/bleed easily.   Psychiatric/Behavioral:  Negative for agitation, behavioral problems, confusion, hallucinations, sleep disturbance and suicidal ideas. The patient is not nervous/anxious.        Medical / Social / Family History     No past medical history on file.     Past Surgical History:   Procedure Laterality Date    Denies         Social History     Socioeconomic History    Marital status:    Tobacco Use    Smoking status: Some Days     Current packs/day: 1.00     Average packs/day: 1 pack/day for 1.5 years (1.5 ttl pk-yrs)     Types: Cigarettes, Vaping with nicotine     Start date: 3/1/2022    Smokeless tobacco: Never    Tobacco comments:     Quit smoking in March 2022   Substance and Sexual Activity    Alcohol use: Yes     Alcohol/week: 3.0 standard drinks of alcohol     Types: 3 Cans of beer per week     Comment: Several times a month-beer    Drug use: Not Currently     Social Determinants of Health     Physical Activity: Inactive (11/30/2022)    Exercise Vital Sign     Days of Exercise per Week: 0 days     Minutes of Exercise per Session: 0 min        Family History   Problem Relation Age of Onset    Diabetes Father     Gout Father     Hypertension Father     Alcohol abuse Father         Medications and Allergies     Medications  Current Outpatient Medications   Medication Instructions    amLODIPine (NORVASC) 10 mg, Oral, Daily    aspirin (ECOTRIN) 81 mg, Oral, Daily    atorvastatin (LIPITOR) 10 mg, Oral, Nightly    empagliflozin (JARDIANCE) 25 mg, Oral, Daily    losartan (COZAAR) 50 mg, Oral, Daily    metFORMIN (GLUCOPHAGE-XR) 1,000 mg, Oral, 2 times daily    terbinafine HCL (LAMISIL AT) 1 % cream Topical (Top), 2 times daily       Allergies  Review of patient's allergies indicates:  No Known Allergies    Physical Examination     Visit Vitals  /74 (BP Location: Left arm, Patient Position: Sitting, BP Method: Large  "(Automatic))   Pulse 86   Temp 98.2 °F (36.8 °C) (Oral)   Resp 20   Ht 5' 7" (1.702 m)   Wt 93.2 kg (205 lb 6.4 oz)   BMI 32.17 kg/m²       Physical Exam  Vitals and nursing note reviewed.   Constitutional:       Appearance: Normal appearance. He is not ill-appearing.   HENT:      Head: Normocephalic.      Right Ear: Tympanic membrane normal. Decreased hearing (wearing hearing aid) noted.      Left Ear: Tympanic membrane normal.      Nose: Nose normal.      Mouth/Throat:      Mouth: Mucous membranes are moist.   Eyes:      Extraocular Movements: Extraocular movements intact.      Conjunctiva/sclera: Conjunctivae normal.      Pupils: Pupils are equal, round, and reactive to light.   Neck:      Thyroid: No thyroid mass, thyromegaly or thyroid tenderness.      Vascular: No carotid bruit.   Cardiovascular:      Rate and Rhythm: Normal rate and regular rhythm.      Pulses: Normal pulses.           Dorsalis pedis pulses are 2+ on the right side and 2+ on the left side.        Posterior tibial pulses are 2+ on the right side and 2+ on the left side.   Pulmonary:      Effort: Pulmonary effort is normal. No respiratory distress.      Breath sounds: Normal breath sounds.   Abdominal:      General: Bowel sounds are normal. There is no distension.      Palpations: Abdomen is soft. There is no mass.      Tenderness: There is no abdominal tenderness.      Hernia: No hernia is present.   Musculoskeletal:         General: Normal range of motion.      Cervical back: Normal range of motion and neck supple. No tenderness.      Right lower leg: No edema.      Left lower leg: No edema.   Feet:      Right foot:      Protective Sensation: 5 sites tested.  5 sites sensed.      Skin integrity: Skin integrity normal.      Left foot:      Protective Sensation: 5 sites tested.  5 sites sensed.      Skin integrity: Skin integrity normal.   Lymphadenopathy:      Cervical: No cervical adenopathy.   Skin:     General: Skin is warm and dry.      " "Capillary Refill: Capillary refill takes less than 2 seconds.   Neurological:      Mental Status: He is alert and oriented to person, place, and time. Mental status is at baseline.      Motor: No weakness.   Psychiatric:         Mood and Affect: Mood normal.         Behavior: Behavior normal.         Thought Content: Thought content normal.         Judgment: Judgment normal.           Results     Lab Results   Component Value Date    WBC 6.89 08/24/2023    RBC 5.75 08/24/2023    HGB 17.5 08/24/2023    HCT 52.2 (H) 08/24/2023    MCV 90.8 08/24/2023    MCH 30.4 08/24/2023    MCHC 33.5 08/24/2023    RDW 13.1 08/24/2023     08/24/2023    MPV 9.7 08/24/2023     Sodium Level   Date Value Ref Range Status   08/24/2023 137 136 - 145 mmol/L Final     Potassium Level   Date Value Ref Range Status   08/24/2023 4.1 3.5 - 5.1 mmol/L Final     Carbon Dioxide   Date Value Ref Range Status   08/24/2023 25 22 - 29 mmol/L Final     Blood Urea Nitrogen   Date Value Ref Range Status   08/24/2023 14.6 8.4 - 25.7 mg/dL Final     Creatinine   Date Value Ref Range Status   08/24/2023 1.28 (H) 0.73 - 1.18 mg/dL Final     Calcium Level Total   Date Value Ref Range Status   08/24/2023 10.0 8.4 - 10.2 mg/dL Final     Albumin Level   Date Value Ref Range Status   08/24/2023 4.2 3.5 - 5.0 g/dL Final     Bilirubin Total   Date Value Ref Range Status   08/24/2023 1.9 (H) <=1.5 mg/dL Final     Alkaline Phosphatase   Date Value Ref Range Status   08/24/2023 92 40 - 150 unit/L Final     Aspartate Aminotransferase   Date Value Ref Range Status   08/24/2023 18 5 - 34 unit/L Final     Alanine Aminotransferase   Date Value Ref Range Status   08/24/2023 20 0 - 55 unit/L Final     Estimated GFR-Non    Date Value Ref Range Status   06/06/2022 >60 mls/min/1.73/m2 Final     Lab Results   Component Value Date    CHOL 118 08/24/2023     Lab Results   Component Value Date    HDL 40 08/24/2023     No results found for: "LDLCALC"  Lab " "Results   Component Value Date    TRIG 101 08/24/2023     No results found for: "CHOLHDL"  Lab Results   Component Value Date    TSH 2.5635 06/06/2022     Lab Results   Component Value Date    PHUR 7.0 05/25/2020    PROTEINUA Negative 05/25/2020    GLUCUA 300 (A) 05/25/2020    KETONESU Negative 05/25/2020    OCCULTUA Negative 05/25/2020    NITRITE Negative 05/25/2020    LEUKOCYTESUR Negative 05/25/2020     Lab Results   Component Value Date    HGBA1C 7.3 (H) 08/24/2023    HGBA1C 7.6 (H) 11/30/2022    HGBA1C 8.3 (H) 06/06/2022     No results found for: "MICROALBUR", "[id number]"   Results for orders placed in visit on 06/06/22    Diabetic Eye Screening Photo         Assessment       ICD-10-CM ICD-9-CM   1. Wellness examination  Z00.00 V70.0   2. Hypertension, unspecified type  I10 401.9   3. Type 2 diabetes mellitus without complication, without long-term current use of insulin  E11.9 250.00   4. Mixed hyperlipidemia  E78.2 272.2   5. Obstructive sleep apnea syndrome  G47.33 327.23   6. Colon cancer screening  Z12.11 V76.51       Plan       1. Hypertension, unspecified type  /74  Follow low sodium diet, < 2 gm/day (avoid high salty foods such as processed meats/ sausage/finnegan/ sandwich meat, chips, pickles, cheese, crackers and soft drinks/ electrolyte replacement drinks).  Avoid tobacco/ alcohol use  Educated on health benefits of at least 5 days/ week of 30 minutes moderate intensity exercise (brisk walking) and 2 or more days/ week of muscle strength activities  Daily ASA 81 mg for CV prevention  Continue current medication regimen  - amLODIPine (NORVASC) 10 MG tablet; Take 1 tablet (10 mg total) by mouth once daily.  Dispense: 90 tablet; Refill: 2  - losartan (COZAAR) 50 MG tablet; Take 1 tablet (50 mg total) by mouth once daily.  Dispense: 90 tablet; Refill: 2    2. Type 2 diabetes mellitus without complication, without long-term current use of insulin  A1c 7.3%  CBGs- 100-120s per pt  Educated on ADA " diet:  1. Avoid/ decrease high carbohydrate foods (rice, pasta, bread, candy, sweets, carbonated beverages)  Educated on health benefits of at least 5 days/ week of 30 minutes moderate intensity exercise (brisk walking) and 2 or more days/ week of muscle strength activities  Avoid alcohol or tobacco use if applicable  Eye exam: 8/24/23 fundus photos   Foot exam: 8/24/23  Continue ASA, statin, ARB   Continue medications  - Ambulatory referral/consult to Nutrition Services; Future  - atorvastatin (LIPITOR) 10 MG tablet; Take 1 tablet (10 mg total) by mouth nightly.  Dispense: 90 tablet; Refill: 2  - empagliflozin (JARDIANCE) 25 mg tablet; Take 1 tablet (25 mg total) by mouth once daily.  Dispense: 90 tablet; Refill: 2  - losartan (COZAAR) 50 MG tablet; Take 1 tablet (50 mg total) by mouth once daily.  Dispense: 90 tablet; Refill: 2  - metFORMIN (GLUCOPHAGE-XR) 500 MG ER 24hr tablet; Take 2 tablets (1,000 mg total) by mouth 2 (two) times daily.  Dispense: 360 tablet; Refill: 2  - Diabetic Eye Screening Photo; Future  - Hemoglobin A1C; Future    3. Mixed hyperlipidemia  LDL 58, Trig 101, HLD 40, Total 118  Avoid tobacco/ alcohol  Follow low fat/low cholesterol diet such as avoid/ decrease finnegan, sausage, fried foods, cookies, cakes, chips, cheese, whole milk, butter, mayonnaise. Add olive oil, avocados, lean meats, fresh fruits/ vegetables, heart healthy nuts to diet.  Educated on health benefits of exercise 5 days/ week of at least 30 minutes moderate intensity exercise (brisk walking) and 2 days/ week of muscle strength activities  Daily ASA 81 mg for CV prevention  Continue current medication regimen  - atorvastatin (LIPITOR) 10 MG tablet; Take 1 tablet (10 mg total) by mouth nightly.  Dispense: 90 tablet; Refill: 2    4. Obstructive sleep apnea syndrome  Compliant with CPAP, benefiting from use, and needs to continue with use of CPAP.  Avoid driving while drowsy.  Regular exercise as tolerated for lower BMI.        5. Colon cancer screening  FIT ordered . Pt self pay, cannot afford cologuard.  - OCCULT BLOOD FECAL IMMUNOASSAY; Future  - OCCULT BLOOD FECAL IMMUNOASSAY    6. Wellness examination  Avoid tobacco/ alcohol/ drugs  Healthy lifestyle habits  Regular exercise as tolerated  PSA 0.37  FIT ordered for CRC screening      Future Appointments   Date Time Provider Department Center   2/26/2024 12:15 PM Bruna Alvarez, NP [facility]        Follow up in about 6 months (around 2/24/2024) for with fasting labs before visit.    Signature:  Bruna Alvarez NP  [facility]  [address] [facility] [address]    Date of encounter: 8/24/23

## 2023-08-25 NOTE — PROGRESS NOTES
Taj Ceja is a 53 y.o. male here for a diabetic eye screening with non-dilated fundus photos per LIBRA Queen.    Patient cooperative?: Yes  Small pupils?: No  Last eye exam: unknown    For exam results, see Encounter Report.

## 2023-08-28 ENCOUNTER — TELEPHONE (OUTPATIENT)
Dept: INTERNAL MEDICINE | Facility: CLINIC | Age: 53
End: 2023-08-28

## 2023-08-28 NOTE — TELEPHONE ENCOUNTER
Please let pt know diabetic eye test did not show any retinopathy. It is recommended to repeat screening in a year.     Thank you

## 2024-02-22 ENCOUNTER — PATIENT MESSAGE (OUTPATIENT)
Dept: INTERNAL MEDICINE | Facility: CLINIC | Age: 54
End: 2024-02-22

## 2024-03-19 ENCOUNTER — OFFICE VISIT (OUTPATIENT)
Dept: INTERNAL MEDICINE | Facility: CLINIC | Age: 54
End: 2024-03-19
Payer: MEDICAID

## 2024-03-19 ENCOUNTER — LAB VISIT (OUTPATIENT)
Dept: LAB | Facility: HOSPITAL | Age: 54
End: 2024-03-19
Attending: NURSE PRACTITIONER
Payer: MEDICAID

## 2024-03-19 VITALS
TEMPERATURE: 98 F | HEART RATE: 87 BPM | HEIGHT: 67 IN | SYSTOLIC BLOOD PRESSURE: 110 MMHG | BODY MASS INDEX: 32.27 KG/M2 | RESPIRATION RATE: 20 BRPM | DIASTOLIC BLOOD PRESSURE: 64 MMHG | WEIGHT: 205.63 LBS

## 2024-03-19 DIAGNOSIS — Z12.11 COLON CANCER SCREENING: ICD-10-CM

## 2024-03-19 DIAGNOSIS — E11.9 TYPE 2 DIABETES MELLITUS WITHOUT COMPLICATION, WITHOUT LONG-TERM CURRENT USE OF INSULIN: ICD-10-CM

## 2024-03-19 DIAGNOSIS — G47.33 OBSTRUCTIVE SLEEP APNEA SYNDROME: ICD-10-CM

## 2024-03-19 DIAGNOSIS — E78.2 MIXED HYPERLIPIDEMIA: ICD-10-CM

## 2024-03-19 DIAGNOSIS — E11.9 TYPE 2 DIABETES MELLITUS WITHOUT COMPLICATION, WITHOUT LONG-TERM CURRENT USE OF INSULIN: Primary | ICD-10-CM

## 2024-03-19 DIAGNOSIS — I10 HYPERTENSION, UNSPECIFIED TYPE: ICD-10-CM

## 2024-03-19 LAB
EST. AVERAGE GLUCOSE BLD GHB EST-MCNC: 159.9 MG/DL
HBA1C MFR BLD: 7.2 %

## 2024-03-19 PROCEDURE — 3078F DIAST BP <80 MM HG: CPT | Mod: CPTII,,, | Performed by: NURSE PRACTITIONER

## 2024-03-19 PROCEDURE — 3008F BODY MASS INDEX DOCD: CPT | Mod: CPTII,,, | Performed by: NURSE PRACTITIONER

## 2024-03-19 PROCEDURE — 99215 OFFICE O/P EST HI 40 MIN: CPT | Mod: PBBFAC | Performed by: NURSE PRACTITIONER

## 2024-03-19 PROCEDURE — 99214 OFFICE O/P EST MOD 30 MIN: CPT | Mod: S$PBB,,, | Performed by: NURSE PRACTITIONER

## 2024-03-19 PROCEDURE — 3051F HG A1C>EQUAL 7.0%<8.0%: CPT | Mod: CPTII,,, | Performed by: NURSE PRACTITIONER

## 2024-03-19 PROCEDURE — 36415 COLL VENOUS BLD VENIPUNCTURE: CPT

## 2024-03-19 PROCEDURE — 1159F MED LIST DOCD IN RCRD: CPT | Mod: CPTII,,, | Performed by: NURSE PRACTITIONER

## 2024-03-19 PROCEDURE — 83036 HEMOGLOBIN GLYCOSYLATED A1C: CPT

## 2024-03-19 PROCEDURE — 4010F ACE/ARB THERAPY RXD/TAKEN: CPT | Mod: CPTII,,, | Performed by: NURSE PRACTITIONER

## 2024-03-19 PROCEDURE — 3074F SYST BP LT 130 MM HG: CPT | Mod: CPTII,,, | Performed by: NURSE PRACTITIONER

## 2024-03-19 RX ORDER — METFORMIN HYDROCHLORIDE 500 MG/1
1000 TABLET, EXTENDED RELEASE ORAL 2 TIMES DAILY
Qty: 360 TABLET | Refills: 2 | Status: SHIPPED | OUTPATIENT
Start: 2024-03-19

## 2024-03-19 RX ORDER — AMLODIPINE BESYLATE 10 MG/1
10 TABLET ORAL DAILY
Qty: 90 TABLET | Refills: 2 | Status: SHIPPED | OUTPATIENT
Start: 2024-03-19

## 2024-03-19 RX ORDER — ATORVASTATIN CALCIUM 10 MG/1
10 TABLET, FILM COATED ORAL NIGHTLY
Qty: 90 TABLET | Refills: 2 | Status: SHIPPED | OUTPATIENT
Start: 2024-03-19

## 2024-03-19 RX ORDER — LOSARTAN POTASSIUM 50 MG/1
50 TABLET ORAL DAILY
Qty: 90 TABLET | Refills: 2 | Status: SHIPPED | OUTPATIENT
Start: 2024-03-19

## 2024-03-19 RX ORDER — GLIPIZIDE 2.5 MG/1
2.5 TABLET, EXTENDED RELEASE ORAL
Qty: 90 TABLET | Refills: 1 | Status: SHIPPED | OUTPATIENT
Start: 2024-03-19 | End: 2025-03-19

## 2024-03-19 NOTE — ASSESSMENT & PLAN NOTE
A1c 7.2%  CBGs 80-180s per pt ; higher in the morning   Educated on ADA diet:  1. Avoid/ decrease high carbohydrate foods (rice, pasta, bread, candy, sweets, carbonated beverages)  Educated on health benefits of at least 5 days/ week of 30 minutes moderate intensity exercise (brisk walking) and 2 or more days/ week of muscle strength activities  Avoid alcohol or tobacco use if applicable  Eye exam: 8/24/23 fundus photos no DR   Foot exam: 8/24/23  Continue ASA, statin, ARB   Continue medications and add glipizide 2.5 mg   F/u 3 mo

## 2024-03-19 NOTE — ASSESSMENT & PLAN NOTE
Compliant with CPAP, benefiting from use, and needs to continue with use of CPAP.  Avoid driving while drowsy.  Regular exercise as tolerated for lower BMI.   Pt needs supplies.

## 2024-03-19 NOTE — ASSESSMENT & PLAN NOTE
BP Readings from Last 3 Encounters:   03/19/24 110/64   08/24/23 113/74   11/30/22 126/79     Follow low sodium diet, < 2 gm/day (avoid high salty foods such as processed meats/ sausage/finnegan/ sandwich meat, chips, pickles, cheese, crackers and soft drinks/ electrolyte replacement drinks).  Avoid tobacco/ alcohol use  Educated on health benefits of at least 5 days/ week of 30 minutes moderate intensity exercise (brisk walking) and 2 or more days/ week of muscle strength activities  Daily ASA 81 mg for CV prevention  Continue current medication regimen

## 2024-03-20 ENCOUNTER — TELEPHONE (OUTPATIENT)
Dept: INTERNAL MEDICINE | Facility: CLINIC | Age: 54
End: 2024-03-20
Payer: MEDICAID

## 2024-03-20 DIAGNOSIS — Z12.11 COLON CANCER SCREENING: ICD-10-CM

## 2024-03-20 DIAGNOSIS — Z80.0 FAMILY HISTORY OF COLON CANCER: Primary | ICD-10-CM

## 2024-03-20 NOTE — TELEPHONE ENCOUNTER
Please let patient know that [facility] GI lab denied referral due to extended backlog.  Referral order to gastroenterologist Dr. Brown who is located in [place].  Please process referral in provide patient with contact information to follow up on appointment status once referral has been received.    Thank you

## 2024-03-20 NOTE — TELEPHONE ENCOUNTER
----- Message from Fátima Ernandez MA sent at 3/20/2024  3:34 PM CDT -----  Regarding: GI Referral  Hello,    I receieved a referral on this PT for a screening colonoscopy.  Due to our back log of referrals, we are currently not accepting referrals for screening procedures.  Please order a FIT test or Cologuard (if meets criteria) on PT then refer back if Positive.      You may also refer this patient to an external GI provider for screening procedure.  Please be sure to fax referrals manually as they do not use Epic for referrals.    Please let me know if you have any questions.    For Medicaid patients refer to:  Dr. Brown or Dr. Chris Avelar    For Medicare, commercial patients refer to:  [facility] or Gastro clinic    Thank you,    GI Dept. [facility]

## 2024-03-21 ENCOUNTER — PATIENT MESSAGE (OUTPATIENT)
Dept: INTERNAL MEDICINE | Facility: CLINIC | Age: 54
End: 2024-03-21
Payer: MEDICAID

## 2024-03-21 NOTE — TELEPHONE ENCOUNTER
Pt advised of message per Np Bruna, verbalized understanding. Sent portal message with Gastro information.

## 2024-03-25 NOTE — ASSESSMENT & PLAN NOTE
Lab Results   Component Value Date    CHOL 118 08/24/2023     Lab Results   Component Value Date    HDL 40 08/24/2023     Lab Results   Component Value Date    TRIG 101 08/24/2023     Lab Results   Component Value Date    LDL 58.00 08/24/2023     Avoid tobacco/ alcohol  Follow low fat/low cholesterol diet such as avoid/ decrease finnegan, sausage, fried foods, cookies, cakes, chips, cheese, whole milk, butter, mayonnaise. Add olive oil, avocados, lean meats, fresh fruits/ vegetables, heart healthy nuts to diet.  Educated on health benefits of exercise 5 days/ week of at least 30 minutes moderate intensity exercise (brisk walking) and 2 days/ week of muscle strength activities  Daily ASA 81 mg for CV prevention  Continue current medication regimen

## 2024-03-26 NOTE — PROGRESS NOTES
Bruna L Kim, NP   [facility]  [address] [facility] [address]      PATIENT NAME: Taj Ceja  : 1970  DATE: 3/19/24  MRN: [id number]        History of Present Illness / Problem Focused Workflow     Taj Ceja presents to the clinic with Results     HPI: 55 yo WM for routine follow up/ lab results. He is accompanied by his wife. PMH includes TN, HLD, DM, left ear hearing loss, hearing aids, tinnitus, CHRISTIAN on CPAP, obesity, history of tobacco abuse. /64. Labs completed. A1c 7.2%. Reports compliance with medications. He needs refill of CPAP supplies. No other concerns.    Review of Systems     Review of Systems   Constitutional: Negative.    HENT: Negative.     Eyes: Negative.    Respiratory: Negative.     Cardiovascular: Negative.    Gastrointestinal: Negative.    Endocrine: Negative.    Genitourinary: Negative.    Musculoskeletal: Negative.    Skin: Negative.    Allergic/Immunologic: Negative.    Neurological: Negative.    Hematological: Negative.    Psychiatric/Behavioral: Negative.         Medical / Social / Family History     No past medical history on file.     Past Surgical History:   Procedure Laterality Date    Denies         Social History     Socioeconomic History    Marital status:    Tobacco Use    Smoking status: Some Days     Current packs/day: 1.00     Average packs/day: 1 pack/day for 2.1 years (2.1 ttl pk-yrs)     Types: Cigarettes, Vaping with nicotine     Start date: 3/1/2022    Smokeless tobacco: Never    Tobacco comments:     Quit smoking in 2022   Substance and Sexual Activity    Alcohol use: Yes     Alcohol/week: 3.0 standard drinks of alcohol     Types: 3 Cans of beer per week     Comment: Several times a month-beer    Drug use: Not Currently     Social Determinants of Health     Financial Resource Strain: Medium Risk (3/19/2024)    Overall Financial Resource Strain (CARDIA)     Difficulty of  Paying Living Expenses: Somewhat hard   Food Insecurity: No Food Insecurity (3/19/2024)    Hunger Vital Sign     Worried About Running Out of Food in the Last Year: Never true     Ran Out of Food in the Last Year: Never true   Transportation Needs: No Transportation Needs (3/19/2024)    PRAPARE - Transportation     Lack of Transportation (Medical): No     Lack of Transportation (Non-Medical): No   Physical Activity: Unknown (3/19/2024)    Exercise Vital Sign     Days of Exercise per Week: Patient declined   Stress: No Stress Concern Present (3/19/2024)    [employer] [place] [employer] - Occupational Stress Questionnaire     Feeling of Stress : Not at all   Social Connections: Unknown (3/19/2024)    Social Connection and Isolation Panel [NHANES]     Frequency of Communication with Friends and Family: More than three times a week     Frequency of Social Gatherings with Friends and Family: Once a week     Active Member of Clubs or Organizations: No     Attends Club or Organization Meetings: Never     Marital Status:    Housing Stability: Low Risk  (3/19/2024)    Housing Stability Vital Sign     Unable to Pay for Housing in the Last Year: No     Number of Places Lived in the Last Year: 1     Unstable Housing in the Last Year: No        Family History   Problem Relation Age of Onset    Diabetes Father     Gout Father     Hypertension Father     Alcohol abuse Father     Colon cancer Brother         Medications and Allergies     Medications  Current Outpatient Medications   Medication Instructions    amLODIPine (NORVASC) 10 mg, Oral, Daily    aspirin (ECOTRIN) 81 mg, Oral, Daily    atorvastatin (LIPITOR) 10 mg, Oral, Nightly    empagliflozin (JARDIANCE) 25 mg, Oral, Daily    glipiZIDE (GLUCOTROL) 2.5 mg, Oral, With breakfast    losartan (COZAAR) 50 mg, Oral, Daily    metFORMIN (GLUCOPHAGE-XR) 1,000 mg, Oral, 2 times daily    terbinafine HCL (LAMISIL AT) 1 % cream Topical (Top), 2 times daily  "      Allergies  Review of patient's allergies indicates:  No Known Allergies    Physical Examination     Visit Vitals  /64 (BP Location: Left arm, Patient Position: Sitting, BP Method: X-Large (Automatic))   Pulse 87   Temp 97.7 °F (36.5 °C) (Oral)   Resp 20   Ht 5' 7" (1.702 m)   Wt 93.3 kg (205 lb 9.6 oz)   BMI 32.20 kg/m²       Physical Exam  Constitutional:       General: He is not in acute distress.     Appearance: Normal appearance. He is normal weight. He is not ill-appearing, toxic-appearing or diaphoretic.   HENT:      Head: Normocephalic.   Cardiovascular:      Rate and Rhythm: Normal rate and regular rhythm.      Pulses:           Dorsalis pedis pulses are 2+ on the right side and 2+ on the left side.        Posterior tibial pulses are 2+ on the right side and 2+ on the left side.   Pulmonary:      Effort: Pulmonary effort is normal. No respiratory distress.      Breath sounds: Normal breath sounds.   Feet:      Right foot:      Protective Sensation: 5 sites tested.  5 sites sensed.      Left foot:      Protective Sensation: 5 sites tested.  5 sites sensed.   Skin:     General: Skin is warm and dry.   Neurological:      General: No focal deficit present.      Mental Status: He is alert and oriented to person, place, and time. Mental status is at baseline.      Motor: No weakness.      Coordination: Coordination normal.      Gait: Gait normal.   Psychiatric:         Mood and Affect: Mood normal.         Behavior: Behavior normal.         Thought Content: Thought content normal.         Judgment: Judgment normal.           Results     Lab Results   Component Value Date    WBC 6.89 08/24/2023    RBC 5.75 08/24/2023    HGB 17.5 08/24/2023    HCT 52.2 (H) 08/24/2023    MCV 90.8 08/24/2023    MCH 30.4 08/24/2023    MCHC 33.5 08/24/2023    RDW 13.1 08/24/2023     08/24/2023    MPV 9.7 08/24/2023     Sodium Level   Date Value Ref Range Status   08/24/2023 137 136 - 145 mmol/L Final     Potassium " "Level   Date Value Ref Range Status   08/24/2023 4.1 3.5 - 5.1 mmol/L Final     Carbon Dioxide   Date Value Ref Range Status   08/24/2023 25 22 - 29 mmol/L Final     Blood Urea Nitrogen   Date Value Ref Range Status   08/24/2023 14.6 8.4 - 25.7 mg/dL Final     Creatinine   Date Value Ref Range Status   08/24/2023 1.28 (H) 0.73 - 1.18 mg/dL Final     Calcium Level Total   Date Value Ref Range Status   08/24/2023 10.0 8.4 - 10.2 mg/dL Final     Albumin Level   Date Value Ref Range Status   08/24/2023 4.2 3.5 - 5.0 g/dL Final     Bilirubin Total   Date Value Ref Range Status   08/24/2023 1.9 (H) <=1.5 mg/dL Final     Alkaline Phosphatase   Date Value Ref Range Status   08/24/2023 92 40 - 150 unit/L Final     Aspartate Aminotransferase   Date Value Ref Range Status   08/24/2023 18 5 - 34 unit/L Final     Alanine Aminotransferase   Date Value Ref Range Status   08/24/2023 20 0 - 55 unit/L Final     Estimated GFR-Non    Date Value Ref Range Status   06/06/2022 >60 mls/min/1.73/m2 Final     Lab Results   Component Value Date    CHOL 118 08/24/2023     Lab Results   Component Value Date    HDL 40 08/24/2023     No results found for: "LDLCALC"  Lab Results   Component Value Date    TRIG 101 08/24/2023     No results found for: "CHOLHDL"  Lab Results   Component Value Date    TSH 2.5635 06/06/2022     Lab Results   Component Value Date    PHUR 7.0 05/25/2020    PROTEINUA Negative 05/25/2020    GLUCUA 300 (A) 05/25/2020    KETONESU Negative 05/25/2020    OCCULTUA Negative 05/25/2020    NITRITE Negative 05/25/2020    LEUKOCYTESUR Negative 05/25/2020     Lab Results   Component Value Date    HGBA1C 7.2 (H) 03/19/2024    HGBA1C 7.3 (H) 08/24/2023    HGBA1C 7.6 (H) 11/30/2022     No results found for: "MICROALBUR", "[id number]"   Results for orders placed in visit on 08/24/23    Diabetic Eye Screening Photo         Assessment       ICD-10-CM ICD-9-CM   1. Type 2 diabetes mellitus without complication, without " long-term current use of insulin  E11.9 250.00   2. Hypertension, unspecified type  I10 401.9   3. Mixed hyperlipidemia  E78.2 272.2   4. Obstructive sleep apnea syndrome  G47.33 327.23   5. Colon cancer screening  Z12.11 V76.51       Plan       Problem List Items Addressed This Visit          Cardiac/Vascular    Hyperlipidemia    Current Assessment & Plan     Lab Results   Component Value Date    CHOL 118 08/24/2023     Lab Results   Component Value Date    HDL 40 08/24/2023     Lab Results   Component Value Date    TRIG 101 08/24/2023     Lab Results   Component Value Date    LDL 58.00 08/24/2023     Avoid tobacco/ alcohol  Follow low fat/low cholesterol diet such as avoid/ decrease finnegan, sausage, fried foods, cookies, cakes, chips, cheese, whole milk, butter, mayonnaise. Add olive oil, avocados, lean meats, fresh fruits/ vegetables, heart healthy nuts to diet.  Educated on health benefits of exercise 5 days/ week of at least 30 minutes moderate intensity exercise (brisk walking) and 2 days/ week of muscle strength activities  Daily ASA 81 mg for CV prevention  Continue current medication regimen           Relevant Medications    atorvastatin (LIPITOR) 10 MG tablet    Hypertension    Current Assessment & Plan     BP Readings from Last 3 Encounters:   03/19/24 110/64   08/24/23 113/74   11/30/22 126/79   Follow low sodium diet, < 2 gm/day (avoid high salty foods such as processed meats/ sausage/finnegan/ sandwich meat, chips, pickles, cheese, crackers and soft drinks/ electrolyte replacement drinks).  Avoid tobacco/ alcohol use  Educated on health benefits of at least 5 days/ week of 30 minutes moderate intensity exercise (brisk walking) and 2 or more days/ week of muscle strength activities  Daily ASA 81 mg for CV prevention  Continue current medication regimen           Relevant Medications    losartan (COZAAR) 50 MG tablet    amLODIPine (NORVASC) 10 MG tablet    Other Relevant Orders    Comprehensive Metabolic  Panel    Hemoglobin A1C    Microalbumin/Creatinine Ratio, Urine       Endocrine    Type 2 diabetes mellitus - Primary    Current Assessment & Plan     A1c 7.2%  CBGs 80-180s per pt ; higher in the morning   Educated on ADA diet:  1. Avoid/ decrease high carbohydrate foods (rice, pasta, bread, candy, sweets, carbonated beverages)  Educated on health benefits of at least 5 days/ week of 30 minutes moderate intensity exercise (brisk walking) and 2 or more days/ week of muscle strength activities  Avoid alcohol or tobacco use if applicable  Eye exam: 8/24/23 fundus photos no DR   Foot exam: 8/24/23  Continue ASA, statin, ARB   Continue medications and add glipizide 2.5 mg   F/u 3 mo          Relevant Medications    glipiZIDE (GLUCOTROL) 2.5 MG TR24    losartan (COZAAR) 50 MG tablet    metFORMIN (GLUCOPHAGE-XR) 500 MG ER 24hr tablet    atorvastatin (LIPITOR) 10 MG tablet    empagliflozin (JARDIANCE) 25 mg tablet    Other Relevant Orders    Ambulatory referral/consult to Nutrition Services    Comprehensive Metabolic Panel    Hemoglobin A1C    Microalbumin/Creatinine Ratio, Urine       Other    Obstructive sleep apnea syndrome    Current Assessment & Plan     Compliant with CPAP, benefiting from use, and needs to continue with use of CPAP.  Avoid driving while drowsy.  Regular exercise as tolerated for lower BMI.   Pt needs supplies.           Relevant Orders    CPAP/BIPAP SUPPLIES     Other Visit Diagnoses       Colon cancer screening        Relevant Orders    Ambulatory referral/consult to Endo Procedure             Future Appointments   Date Time Provider Department Center   6/19/2024  8:40 AM Bruna Alvarez NP [facility]        Follow up in about 3 months (around 6/19/2024) for with fasting labs before visit.    Signature:  Bruna Alvarez NP  [facility]  [address] [facility] [address]    Date of encounter: 3/19/24

## 2024-06-04 ENCOUNTER — PATIENT MESSAGE (OUTPATIENT)
Dept: NUTRITION | Facility: HOSPITAL | Age: 54
End: 2024-06-04
Payer: MEDICAID

## 2024-09-12 ENCOUNTER — HOSPITAL ENCOUNTER (EMERGENCY)
Facility: HOSPITAL | Age: 54
Discharge: HOME | End: 2024-09-12
Attending: EMERGENCY MEDICINE

## 2024-09-12 VITALS
HEIGHT: 67 IN | DIASTOLIC BLOOD PRESSURE: 93 MMHG | BODY MASS INDEX: 33.98 KG/M2 | HEART RATE: 85 BPM | WEIGHT: 216.5 LBS | TEMPERATURE: 97 F | OXYGEN SATURATION: 96 % | SYSTOLIC BLOOD PRESSURE: 157 MMHG | RESPIRATION RATE: 20 BRPM

## 2024-09-12 DIAGNOSIS — H72.91 RUPTURED TYMPANIC MEMBRANE, RIGHT: Primary | ICD-10-CM

## 2024-09-12 PROCEDURE — 99283 EMERGENCY DEPT VISIT LOW MDM: CPT

## 2024-09-12 RX ORDER — OFLOXACIN 3 MG/ML
3 SOLUTION AURICULAR (OTIC) 2 TIMES DAILY
Qty: 5 ML | Refills: 0 | Status: SHIPPED | OUTPATIENT
Start: 2024-09-12 | End: 2024-09-20

## 2024-09-13 NOTE — ED PROVIDER NOTES
Encounter Date: 9/12/2024       History     Chief Complaint   Patient presents with    Otalgia     Right ear pain and bleeding per pt     54 year old male presents with right ear bleeding. He was cleaning his ear with a q-tip PTA and felt a pop. There was blood that came out. He stated he thought he ruptured a sore in his ear canal. He initially lost hearing but it has returned.     The history is provided by the patient. No  was used.     Review of patient's allergies indicates:  No Known Allergies  No past medical history on file.  Past Surgical History:   Procedure Laterality Date    Denies       Family History   Problem Relation Name Age of Onset    Diabetes Father      Gout Father      Hypertension Father      Alcohol abuse Father      Colon cancer Brother       Social History     Tobacco Use    Smoking status: Some Days     Current packs/day: 1.00     Average packs/day: 1 pack/day for 2.5 years (2.5 ttl pk-yrs)     Types: Cigarettes, Vaping with nicotine     Start date: 3/1/2022    Smokeless tobacco: Never    Tobacco comments:     Quit smoking in March 2022   Substance Use Topics    Alcohol use: Yes     Alcohol/week: 3.0 standard drinks of alcohol     Types: 3 Cans of beer per week     Comment: Several times a month-beer    Drug use: Not Currently     Review of Systems   Constitutional:  Negative for fever.   HENT:  Positive for ear discharge and ear pain. Negative for sore throat.    Respiratory:  Negative for shortness of breath.    Cardiovascular:  Negative for chest pain.   Gastrointestinal:  Negative for nausea.   Genitourinary:  Negative for dysuria.   Musculoskeletal:  Negative for back pain.   Skin:  Negative for rash.   Neurological:  Negative for weakness.   Hematological:  Does not bruise/bleed easily.       Physical Exam     Initial Vitals [09/12/24 2204]   BP Pulse Resp Temp SpO2   (!) 157/93 85 20 97.3 °F (36.3 °C) 96 %      MAP       --         Physical Exam    Nursing note  and vitals reviewed.  Constitutional: He appears well-developed and well-nourished. No distress.   HENT:   Head: Normocephalic and atraumatic.   Right Ear: External ear normal. There is drainage. Tympanic membrane is perforated. Decreased hearing is noted.   Left Ear: Hearing, tympanic membrane, external ear and ear canal normal.   Eyes: Pupils are equal, round, and reactive to light.   Neck: Neck supple.   Normal range of motion.  Cardiovascular:  Normal rate, regular rhythm, normal heart sounds and intact distal pulses.           Pulmonary/Chest: Breath sounds normal.   Abdominal: Abdomen is soft. Bowel sounds are normal.   Musculoskeletal:         General: Normal range of motion.      Cervical back: Normal range of motion and neck supple.     Neurological: He is alert and oriented to person, place, and time. He has normal strength. GCS score is 15. GCS eye subscore is 4. GCS verbal subscore is 5. GCS motor subscore is 6.   Skin: Skin is warm and dry.   Psychiatric: Thought content normal.         ED Course   Procedures  Labs Reviewed - No data to display       Imaging Results    None          Medications - No data to display  Medical Decision Making  54 year old male presents with right ear bleeding. He was cleaning his ear with a q-tip PTA and felt a pop. There was blood that came out. He stated he thought he ruptured a sore in his ear canal. He initially lost hearing but it has returned.     Ofloxacin otic drops given. Referred to ENT. Follow up with ENT. ER precautions given. Do not use anything in ear.     Risk  Prescription drug management.  Risk Details: Risk Details: Given strict ED return precautions. I have spoken with the patient and/or caregivers. I have explained the patient's condition, diagnoses and treatment plan based on the information available to me at this time. I have answered the patient's and/or caregiver's questions and addressed any concerns. The patient and/or caregivers have as good an  understanding of the patient's diagnosis, condition and treatment plan as can be expected at this point. The vital signs have been stable. The patient's condition is stable and appropriate for discharge from the emergency department.      The patient will pursue further outpatient evaluation with the primary care physician or other designated or consulting physician as outlined in the discharge instructions. The patient and/or caregivers are agreeable to this plan of care and follow-up instructions have been explained in detail. The patient and/or caregivers have received these instructions in written format and have expressed an understanding of the discharge instructions. The patient and/or caregivers are aware that any significant change in condition or worsening of symptoms should prompt an immediate return to this or the closest emergency department or a call to 911.           Additional MDM:   Differential Diagnosis:   Otitis media, Otitis Externa, Pharyngitis, cerumen impaction, ear effusion, sinusitis, among others                             It is important that you follow up with your primary care provider or specialist if indicated for further evaluation, workup, and treatment as necessary. The exam and treatment you received in Emergency Department was for an urgent problem and NOT INTENDED AS COMPLETE CARE. It is important that you FOLLOW UP with a doctor for ongoing care. If your symptoms become WORSE or you DO NOT IMPROVE and you are unable to reach your health care provider, you should RETURN to the Emergency Department          Clinical Impression:  Final diagnoses:  [H72.91] Ruptured tympanic membrane, right (Primary)          ED Disposition Condition    Discharge Stable          ED Prescriptions       Medication Sig Dispense Start Date End Date Auth. Provider    ofloxacin (FLOXIN) 0.3 % otic solution Place 3 drops into the right ear 2 (two) times daily. for 7 days 5 mL 9/12/2024 9/19/2024  Bruna Helm, LIBRA          Follow-up Information       Follow up With Specialties Details Why Contact Info Additional Information    [facility], Entrance 6 Otolaryngology Schedule an appointment as soon as possible for a visit in 3 days  [address] [facility] [id number] [phone] [facility],  Entrance #6 Please sign with the  when you arrive.             Bruna Helm FNP  09/12/24 [id number]

## 2024-09-18 ENCOUNTER — APPOINTMENT (OUTPATIENT)
Dept: LAB | Facility: HOSPITAL | Age: 54
End: 2024-09-18
Attending: NURSE PRACTITIONER

## 2024-09-18 ENCOUNTER — OFFICE VISIT (OUTPATIENT)
Dept: INTERNAL MEDICINE | Facility: CLINIC | Age: 54
End: 2024-09-18

## 2024-09-18 VITALS
SYSTOLIC BLOOD PRESSURE: 108 MMHG | HEIGHT: 67 IN | HEART RATE: 89 BPM | WEIGHT: 214.81 LBS | TEMPERATURE: 99 F | RESPIRATION RATE: 20 BRPM | DIASTOLIC BLOOD PRESSURE: 72 MMHG | OXYGEN SATURATION: 95 % | BODY MASS INDEX: 33.72 KG/M2

## 2024-09-18 DIAGNOSIS — I10 HYPERTENSION, UNSPECIFIED TYPE: ICD-10-CM

## 2024-09-18 DIAGNOSIS — E78.2 MIXED HYPERLIPIDEMIA: ICD-10-CM

## 2024-09-18 DIAGNOSIS — Z12.11 COLON CANCER SCREENING: ICD-10-CM

## 2024-09-18 DIAGNOSIS — Z80.0 FAMILY HISTORY OF COLON CANCER: ICD-10-CM

## 2024-09-18 DIAGNOSIS — I10 PRIMARY HYPERTENSION: ICD-10-CM

## 2024-09-18 DIAGNOSIS — B35.1 ONYCHOMYCOSIS: ICD-10-CM

## 2024-09-18 DIAGNOSIS — Z12.5 PROSTATE CANCER SCREENING: ICD-10-CM

## 2024-09-18 DIAGNOSIS — G47.33 OBSTRUCTIVE SLEEP APNEA SYNDROME: ICD-10-CM

## 2024-09-18 DIAGNOSIS — E66.9 OBESITY, UNSPECIFIED CLASSIFICATION, UNSPECIFIED OBESITY TYPE, UNSPECIFIED WHETHER SERIOUS COMORBIDITY PRESENT: ICD-10-CM

## 2024-09-18 DIAGNOSIS — E11.9 TYPE 2 DIABETES MELLITUS WITHOUT COMPLICATION, WITHOUT LONG-TERM CURRENT USE OF INSULIN: Primary | ICD-10-CM

## 2024-09-18 PROCEDURE — 99214 OFFICE O/P EST MOD 30 MIN: CPT | Mod: PBBFAC | Performed by: NURSE PRACTITIONER

## 2024-09-18 PROCEDURE — 99215 OFFICE O/P EST HI 40 MIN: CPT | Mod: S$PBB,,, | Performed by: NURSE PRACTITIONER

## 2024-09-18 RX ORDER — OFLOXACIN 3 MG/ML
3 SOLUTION/ DROPS OPHTHALMIC 2 TIMES DAILY
COMMUNITY
Start: 2024-09-13

## 2024-09-18 RX ORDER — ATORVASTATIN CALCIUM 10 MG/1
10 TABLET, FILM COATED ORAL NIGHTLY
Qty: 90 TABLET | Refills: 2 | Status: SHIPPED | OUTPATIENT
Start: 2024-09-18

## 2024-09-18 RX ORDER — LOSARTAN POTASSIUM 50 MG/1
50 TABLET ORAL DAILY
Qty: 90 TABLET | Refills: 2 | Status: SHIPPED | OUTPATIENT
Start: 2024-09-18

## 2024-09-18 RX ORDER — GLIPIZIDE 2.5 MG/1
2.5 TABLET, EXTENDED RELEASE ORAL
Qty: 90 TABLET | Refills: 2 | Status: SHIPPED | OUTPATIENT
Start: 2024-09-18 | End: 2025-09-18

## 2024-09-18 RX ORDER — CICLOPIROX 80 MG/ML
SOLUTION TOPICAL NIGHTLY
Qty: 6.6 ML | Refills: 11 | Status: SHIPPED | OUTPATIENT
Start: 2024-09-18

## 2024-09-18 RX ORDER — AMLODIPINE BESYLATE 10 MG/1
10 TABLET ORAL DAILY
Qty: 90 TABLET | Refills: 2 | Status: SHIPPED | OUTPATIENT
Start: 2024-09-18

## 2024-09-18 RX ORDER — METFORMIN HYDROCHLORIDE 500 MG/1
1000 TABLET, EXTENDED RELEASE ORAL 2 TIMES DAILY
Qty: 360 TABLET | Refills: 2 | Status: SHIPPED | OUTPATIENT
Start: 2024-09-18

## 2024-09-18 NOTE — ASSESSMENT & PLAN NOTE
BP Readings from Last 3 Encounters:   09/18/24 108/72   09/12/24 (!) 157/93   03/19/24 110/64     Follow low sodium diet, < 2 gm/day (avoid high salty foods such as processed meats/ sausage/finnegan/ sandwich meat, chips, pickles, cheese, crackers and soft drinks/ electrolyte replacement drinks).  Avoid tobacco/ alcohol use  Educated on health benefits of at least 5 days/ week of 30 minutes moderate intensity exercise (brisk walking) and 2 or more days/ week of muscle strength activities  Daily ASA 81 mg for CV prevention  Continue current medication regimen

## 2024-09-18 NOTE — ASSESSMENT & PLAN NOTE
BMI 33.63  Educated on increased risk of disease s/t obesity.  Educated on health benefits of at least 5 days/ week of 30 minutes moderate intensity exercise (brisk walking) and 2 or more days/ week of muscle strength activities (as tolerated).  Eat well balanced diet of fresh fruits/ vegetables, whole grains, lean meats and limit high carbohydrate foods.

## 2024-09-18 NOTE — PROGRESS NOTES
Bruna L Kim, NP   [facility]  [address] [facility] [address]      PATIENT NAME: Taj Ceja  : 1970  DATE: 24  MRN: [id number]        History of Present Illness / Problem Focused Workflow     Taj Ceja presents to the clinic with Follow-up and Diabetes     55 yo male for follow up.  He presents unaccompanied.  Last visit 3/19/24.  Active diagnosis TN, HLD, DM, left ear hearing loss, hearing aids, tinnitus, CHRISTIAN on CPAP, obesity.  Past medical history of tobacco abuse.  Labs completed with improved A1c 6.9%.  /72.  He is due for diabetic foot and eye exam today.  He had recent ER visit for ruptured tympanic membrane to right ear.  He has an appointment with [facility] clinic this Friday and plans to attend.  He has not yet been scheduled for colonoscopy with external provider.  He no longer has insurance.  A he appreciates referral to [facility] for completion.  He has family history of colon cancer.  He continues to use CPAP.  He denies any chest pain, shortness a breath, headache, dizziness, abdominal pain, nausea, vomiting, diarrhea, hematochezia, hematuria.    Other providers   [facility]    Review of Systems     Review of Systems   Constitutional: Negative.    HENT: Negative.     Eyes: Negative.    Respiratory: Negative.     Cardiovascular: Negative.    Gastrointestinal: Negative.    Endocrine: Negative.    Genitourinary: Negative.    Musculoskeletal: Negative.    Skin: Negative.    Allergic/Immunologic: Negative.    Neurological: Negative.    Hematological: Negative.    Psychiatric/Behavioral: Negative.         Medical / Social / Family History     No past medical history on file.     Past Surgical History:   Procedure Laterality Date    Denies         Social History     Socioeconomic History    Marital status:    Tobacco Use    Smoking status: Some Days     Current packs/day: 0.00     Average packs/day: 1 pack/day for  12.0 years (12.0 ttl pk-yrs)     Types: Cigarettes, Vaping with nicotine     Start date: 3/1/2010     Last attempt to quit: 3/1/2022     Years since quittin.5    Smokeless tobacco: Never   Substance and Sexual Activity    Alcohol use: Not Currently     Alcohol/week: 3.0 standard drinks of alcohol     Types: 3 Cans of beer per week     Comment: Several times a month-beer    Drug use: Not Currently    Sexual activity: Yes     Social Determinants of Health     Financial Resource Strain: Medium Risk (3/19/2024)    Overall Financial Resource Strain (CARDIA)     Difficulty of Paying Living Expenses: Somewhat hard   Food Insecurity: No Food Insecurity (3/19/2024)    Hunger Vital Sign     Worried About Running Out of Food in the Last Year: Never true     Ran Out of Food in the Last Year: Never true   Transportation Needs: No Transportation Needs (3/19/2024)    PRAPARE - Transportation     Lack of Transportation (Medical): No     Lack of Transportation (Non-Medical): No   Physical Activity: Unknown (3/19/2024)    Exercise Vital Sign     Days of Exercise per Week: Patient declined   Stress: No Stress Concern Present (3/19/2024)    [employer] [place] [employer] - Occupational Stress Questionnaire     Feeling of Stress : Not at all   Housing Stability: Low Risk  (3/19/2024)    Housing Stability Vital Sign     Unable to Pay for Housing in the Last Year: No     Number of Places Lived in the Last Year: 1     Unstable Housing in the Last Year: No        Family History   Problem Relation Name Age of Onset    Diabetes Father      Gout Father      Hypertension Father      Alcohol abuse Father      Colon cancer Brother          Medications and Allergies     Medications  Current Outpatient Medications   Medication Instructions    amLODIPine (NORVASC) 10 mg, Oral, Daily    aspirin (ECOTRIN) 81 mg, Oral, Daily    atorvastatin (LIPITOR) 10 mg, Oral, Nightly    ciclopirox (PENLAC) 8 % Soln Topical (Top), Nightly     "empagliflozin (JARDIANCE) 25 mg, Oral, Daily    glipiZIDE (GLUCOTROL) 2.5 mg, Oral, With breakfast    losartan (COZAAR) 50 mg, Oral, Daily    metFORMIN (GLUCOPHAGE-XR) 1,000 mg, Oral, 2 times daily    ofloxacin (FLOXIN) 0.3 % otic solution 3 drops, Right Ear, 2 times daily    ofloxacin (OCUFLOX) 0.3 % ophthalmic solution 3 drops, 2 times daily    terbinafine HCL (LAMISIL AT) 1 % cream Topical (Top), 2 times daily       Allergies  Review of patient's allergies indicates:  No Known Allergies    Physical Examination     Visit Vitals  /72 (BP Location: Left arm, Patient Position: Sitting, BP Method: Large (Automatic))   Pulse 89   Temp 98.8 °F (37.1 °C) (Oral)   Resp 20   Ht 5' 7.01" (1.702 m)   Wt 97.4 kg (214 lb 12.8 oz)   SpO2 95%   BMI 33.63 kg/m²       Physical Exam  Constitutional:       General: He is not in acute distress.     Appearance: Normal appearance. He is normal weight. He is not ill-appearing, toxic-appearing or diaphoretic.   HENT:      Head: Normocephalic.      Right Ear: Tympanic membrane is perforated (blood noted).      Ears:      Comments: Recent trauma s/t Q tip  Cardiovascular:      Rate and Rhythm: Normal rate and regular rhythm.      Pulses:           Dorsalis pedis pulses are 2+ on the right side and 2+ on the left side.        Posterior tibial pulses are 2+ on the right side and 2+ on the left side.      Heart sounds: No murmur heard.  Pulmonary:      Effort: Pulmonary effort is normal. No respiratory distress.      Breath sounds: Normal breath sounds. No stridor. No wheezing, rhonchi or rales.   Abdominal:      Tenderness: There is no abdominal tenderness.   Feet:      Right foot:      Protective Sensation: 5 sites tested.  5 sites sensed.      Skin integrity: Skin integrity normal.      Toenail Condition: Fungal disease present.     Left foot:      Protective Sensation: 5 sites tested.  5 sites sensed.      Skin integrity: Skin integrity normal.      Toenail Condition: Left toenails " are normal.      Comments: Right great toe nail fungal disease  Skin:     General: Skin is warm and dry.   Neurological:      General: No focal deficit present.      Mental Status: He is alert and oriented to person, place, and time. Mental status is at baseline.      Motor: No weakness.      Coordination: Coordination normal.      Gait: Gait normal.   Psychiatric:         Mood and Affect: Mood normal.         Behavior: Behavior normal.         Thought Content: Thought content normal.         Judgment: Judgment normal.           Results     Lab Results   Component Value Date    WBC 6.89 08/24/2023    RBC 5.75 08/24/2023    HGB 17.5 08/24/2023    HCT 52.2 (H) 08/24/2023    MCV 90.8 08/24/2023    MCH 30.4 08/24/2023    MCHC 33.5 08/24/2023    RDW 13.1 08/24/2023     08/24/2023    MPV 9.7 08/24/2023     Sodium   Date Value Ref Range Status   09/18/2024 138 136 - 145 mmol/L Final     Potassium   Date Value Ref Range Status   09/18/2024 3.9 3.5 - 5.1 mmol/L Final     Chloride   Date Value Ref Range Status   09/18/2024 106 98 - 107 mmol/L Final     CO2   Date Value Ref Range Status   09/18/2024 25 22 - 29 mmol/L Final     Blood Urea Nitrogen   Date Value Ref Range Status   09/18/2024 23.6 8.4 - 25.7 mg/dL Final     Creatinine   Date Value Ref Range Status   09/18/2024 1.22 (H) 0.73 - 1.18 mg/dL Final     Calcium   Date Value Ref Range Status   09/18/2024 9.1 8.4 - 10.2 mg/dL Final     Albumin   Date Value Ref Range Status   09/18/2024 3.8 3.5 - 5.0 g/dL Final     Bilirubin Total   Date Value Ref Range Status   09/18/2024 1.3 <=1.5 mg/dL Final     ALP   Date Value Ref Range Status   09/18/2024 91 40 - 150 unit/L Final     AST   Date Value Ref Range Status   09/18/2024 21 5 - 34 unit/L Final     ALT   Date Value Ref Range Status   09/18/2024 21 0 - 55 unit/L Final     Estimated GFR-Non    Date Value Ref Range Status   06/06/2022 >60 mls/min/1.73/m2 Final     Lab Results   Component Value Date    CHOL  118 08/24/2023     Lab Results   Component Value Date    HDL 40 08/24/2023     Lab Results   Component Value Date    TRIG 101 08/24/2023     Lab Results   Component Value Date    LDL 58.00 08/24/2023     Lab Results   Component Value Date    TSH 2.5635 06/06/2022     Lab Results   Component Value Date    PHUR 7.0 05/25/2020    PROTEINUA Negative 05/25/2020    GLUCUA 300 (A) 05/25/2020    KETONESU Negative 05/25/2020    OCCULTUA Negative 05/25/2020    NITRITE Negative 05/25/2020    LEUKOCYTESUR Negative 05/25/2020     Lab Results   Component Value Date    HGBA1C 6.9 09/18/2024    HGBA1C 7.2 (H) 03/19/2024    HGBA1C 7.3 (H) 08/24/2023     Lab Results   Component Value Date    MICALBCREAT  09/18/2024      Comment:      Unable to calculate        Assessment       ICD-10-CM ICD-9-CM   1. Type 2 diabetes mellitus without complication, without long-term current use of insulin  E11.9 250.00   2. Family history of colon cancer  Z80.0 V16.0   3. Colon cancer screening  Z12.11 V76.51   4. Primary hypertension  I10 401.9   5. Mixed hyperlipidemia  E78.2 272.2   6. Obesity, unspecified classification, unspecified obesity type, unspecified whether serious comorbidity present  E66.9 278.00   7. Obstructive sleep apnea syndrome  G47.33 327.23   8. Onychomycosis  B35.1 110.1   9. Hypertension, unspecified type  I10 401.9   10. Prostate cancer screening  Z12.5 V76.44       Plan       Problem List Items Addressed This Visit          Derm    Onychomycosis    Current Assessment & Plan     Feet clean and dry.  Rx Penlac as directed to right foot great toe         Relevant Medications    ciclopirox (PENLAC) 8 % Soln       Cardiac/Vascular    Hyperlipidemia    Relevant Medications    atorvastatin (LIPITOR) 10 MG tablet    Other Relevant Orders    CBC Auto Differential    Comprehensive Metabolic Panel    Hemoglobin A1C    Lipid Panel    Hypertension    Overview     Current medication amlodipine 10 mg, losartan 50 mg         Current  Assessment & Plan     BP Readings from Last 3 Encounters:   09/18/24 108/72   09/12/24 (!) 157/93   03/19/24 110/64     Follow low sodium diet, < 2 gm/day (avoid high salty foods such as processed meats/ sausage/finnegan/ sandwich meat, chips, pickles, cheese, crackers and soft drinks/ electrolyte replacement drinks).  Avoid tobacco/ alcohol use  Educated on health benefits of at least 5 days/ week of 30 minutes moderate intensity exercise (brisk walking) and 2 or more days/ week of muscle strength activities  Daily ASA 81 mg for CV prevention  Continue current medication regimen           Relevant Medications    amLODIPine (NORVASC) 10 MG tablet    losartan (COZAAR) 50 MG tablet    Other Relevant Orders    CBC Auto Differential    Comprehensive Metabolic Panel    Hemoglobin A1C    Lipid Panel       Oncology    Family history of colon cancer    Current Assessment & Plan     Patient denies previous colonoscopy.  Previously referred to Dr. Brown for colonoscopy however patient was unable to contact.  Patient currently does not have any insurance.  Referral to [facility] GI lab for screening colonoscopy due to family history         Relevant Orders    Ambulatory referral/consult to Endo Procedure        Endocrine    Obesity    Current Assessment & Plan     BMI 33.63  Educated on increased risk of disease s/t obesity.  Educated on health benefits of at least 5 days/ week of 30 minutes moderate intensity exercise (brisk walking) and 2 or more days/ week of muscle strength activities (as tolerated).  Eat well balanced diet of fresh fruits/ vegetables, whole grains, lean meats and limit high carbohydrate foods.            Type 2 diabetes mellitus - Primary    Current Assessment & Plan     Lab Results   Component Value Date    HGBA1C 6.9 09/18/2024       CBGs   Hypoglycemia: denies  Lab Results   Component Value Date    MICALBCREAT  09/18/2024      Comment:      Unable to calculate       Educated on ADA diet:  1. Avoid/  decrease high carbohydrate foods (rice, pasta, bread, candy, sweets, carbonated beverages)  Educated on health benefits of at least 5 days/ week of 30 minutes moderate intensity exercise (brisk walking) and 2 or more days/ week of muscle strength activities  Avoid alcohol or tobacco use if applicable  Eye exam: 8/24/23 fundus photos no DR Smith 9/18/24 ordered   Foot exam: 9/18/24  Continue ASA, statin, ARB   Continue medications: Jardiance 25 mg, Glipizide 2.5 mg, Metformin ER 1000 mg BID          Relevant Medications    atorvastatin (LIPITOR) 10 MG tablet    empagliflozin (JARDIANCE) 25 mg tablet    glipiZIDE (GLUCOTROL) 2.5 MG TR24    losartan (COZAAR) 50 MG tablet    metFORMIN (GLUCOPHAGE-XR) 500 MG ER 24hr tablet    Other Relevant Orders    Diabetic Eye Screening Photo    CBC Auto Differential    Comprehensive Metabolic Panel    Hemoglobin A1C    Lipid Panel       Other    Obstructive sleep apnea syndrome    Current Assessment & Plan     Compliant with CPAP, benefiting from use, and needs to continue with use of CPAP.  Avoid driving while drowsy.  Regular exercise as tolerated for lower BMI.            Relevant Orders    CBC Auto Differential     Other Visit Diagnoses       Colon cancer screening        Relevant Orders    Ambulatory referral/consult to Endo Procedure     Prostate cancer screening        Relevant Orders    PSA, Screening            Future Appointments   Date Time Provider Department Center   9/20/2024 11:00 AM Annette Gurrola FNP [facility] [place]    3/18/2025 10:40 AM [facility]una Alvarez [facility]      I spent a total of 42 minutes on the day of the visit.  This includes face to face time and non-face to face time preparing to see the patient (eg, review of tests), obtaining and/or reviewing separately obtained history, documenting clinical information in the electronic or other health record, independently interpreting results and communicating results to the  patient/family/caregiver, or care coordinator.    Follow up in about 6 months (around 3/18/2025) for DM with fasting labs before visit/ CBG log.    Signature:  Bruna Alvarez NP  [facility]  [address] [address]    Date of encounter: 9/18/24

## 2024-09-18 NOTE — ASSESSMENT & PLAN NOTE
Patient denies previous colonoscopy.  Previously referred to Dr. Brown for colonoscopy however patient was unable to contact.  Patient currently does not have any insurance.  Referral to [facility] GI lab for screening colonoscopy due to family history

## 2024-09-18 NOTE — ASSESSMENT & PLAN NOTE
Lab Results   Component Value Date    HGBA1C 6.9 09/18/2024       CBGs   Hypoglycemia: denies  Lab Results   Component Value Date    MICALBCREAT  09/18/2024      Comment:      Unable to calculate       Educated on ADA diet:  1. Avoid/ decrease high carbohydrate foods (rice, pasta, bread, candy, sweets, carbonated beverages)  Educated on health benefits of at least 5 days/ week of 30 minutes moderate intensity exercise (brisk walking) and 2 or more days/ week of muscle strength activities  Avoid alcohol or tobacco use if applicable  Eye exam: 8/24/23 fundus photos no DR Sarah 9/18/24 ordered   Foot exam: 9/18/24  Continue ASA, statin, ARB   Continue medications: Jardiance 25 mg, Glipizide 2.5 mg, Metformin ER 1000 mg BID

## 2024-09-18 NOTE — PROGRESS NOTES
[facility]  Otolaryngology Clinic Note    Taj Ceja  YOB: 1970    Chief Complaint:   Chief Complaint   Patient presents with    referral: Rupture Tympanic Membrane        HPI: 2024: 54 y.o. male referred for TM perforation following injury with qtip. States he was trying to clean his right ear with a qtip and saw wax and blood on the qtip. He has since been having increased difficulty hearing on the right. States he has longstanding HL and is deaf on the left, wears an aid on the right. He has an upcoming appt with [facility] to update his audiogram. Denies hx of recurrent otologic infections or procedures/surgeries.     ROS:   10-point review of systems negative except per HPI      Review of patient's allergies indicates:  No Known Allergies    No past medical history on file.    Past Surgical History:   Procedure Laterality Date    Denies         Social History     Socioeconomic History    Marital status:    Tobacco Use    Smoking status: Some Days     Current packs/day: 0.00     Average packs/day: 1 pack/day for 12.0 years (12.0 ttl pk-yrs)     Types: Cigarettes, Vaping w/o nicotine     Start date: 3/1/2010     Last attempt to quit: 3/1/2022     Years since quittin.5    Smokeless tobacco: Never   Substance and Sexual Activity    Alcohol use: Not Currently     Alcohol/week: 3.0 standard drinks of alcohol     Types: 3 Cans of beer per week     Comment: Several times a month-beer    Drug use: Not Currently    Sexual activity: Yes     Social Determinants of Health     Financial Resource Strain: Medium Risk (3/19/2024)    Overall Financial Resource Strain (CARDIA)     Difficulty of Paying Living Expenses: Somewhat hard   Food Insecurity: No Food Insecurity (3/19/2024)    Hunger Vital Sign     Worried About Running Out of Food in the Last Year: Never true     Ran Out of Food in the Last Year: Never true   Transportation Needs: No Transportation Needs (3/19/2024)     PRAPARE - Transportation     Lack of Transportation (Medical): No     Lack of Transportation (Non-Medical): No   Physical Activity: Unknown (3/19/2024)    Exercise Vital Sign     Days of Exercise per Week: Patient declined   Stress: No Stress Concern Present (3/19/2024)    [employer] [place] [employer] - Occupational Stress Questionnaire     Feeling of Stress : Not at all   Housing Stability: Low Risk  (3/19/2024)    Housing Stability Vital Sign     Unable to Pay for Housing in the Last Year: No     Number of Places Lived in the Last Year: 1     Unstable Housing in the Last Year: No       Family History   Problem Relation Name Age of Onset    Diabetes Father      Gout Father      Hypertension Father      Alcohol abuse Father      Colon cancer Brother         Outpatient Encounter Medications as of 9/20/2024   Medication Sig Dispense Refill    amLODIPine (NORVASC) 10 MG tablet Take 1 tablet (10 mg total) by mouth once daily. 90 tablet 2    aspirin (ECOTRIN) 81 MG EC tablet Take 81 mg by mouth once daily.      atorvastatin (LIPITOR) 10 MG tablet Take 1 tablet (10 mg total) by mouth nightly. 90 tablet 2    ciclopirox (PENLAC) 8 % Soln Apply topically nightly. 6.6 mL 11    empagliflozin (JARDIANCE) 25 mg tablet Take 1 tablet (25 mg total) by mouth once daily. 90 tablet 2    glipiZIDE (GLUCOTROL) 2.5 MG TR24 Take 1 tablet (2.5 mg total) by mouth daily with breakfast. 90 tablet 2    losartan (COZAAR) 50 MG tablet Take 1 tablet (50 mg total) by mouth once daily. 90 tablet 2    metFORMIN (GLUCOPHAGE-XR) 500 MG ER 24hr tablet Take 2 tablets (1,000 mg total) by mouth 2 (two) times daily. 360 tablet 2    ofloxacin (FLOXIN) 0.3 % otic solution Place 3 drops into the right ear 2 (two) times daily. for 7 days 5 mL 0    ofloxacin (OCUFLOX) 0.3 % ophthalmic solution 3 drops 2 (two) times daily. (Patient not taking: Reported on 9/20/2024)      terbinafine HCL (LAMISIL AT) 1 % cream Apply topically 2 (two) times daily.  (Patient not taking: Reported on 9/20/2024) 60 g 1    [DISCONTINUED] amLODIPine (NORVASC) 10 MG tablet Take 1 tablet (10 mg total) by mouth once daily. 90 tablet 2    [DISCONTINUED] atorvastatin (LIPITOR) 10 MG tablet Take 1 tablet (10 mg total) by mouth nightly. 90 tablet 2    [DISCONTINUED] empagliflozin (JARDIANCE) 25 mg tablet Take 1 tablet (25 mg total) by mouth once daily. 90 tablet 2    [DISCONTINUED] glipiZIDE (GLUCOTROL) 2.5 MG TR24 Take 1 tablet (2.5 mg total) by mouth daily with breakfast. 90 tablet 1    [DISCONTINUED] losartan (COZAAR) 50 MG tablet Take 1 tablet (50 mg total) by mouth once daily. 90 tablet 2    [DISCONTINUED] metFORMIN (GLUCOPHAGE-XR) 500 MG ER 24hr tablet Take 2 tablets (1,000 mg total) by mouth 2 (two) times daily. 360 tablet 2     No facility-administered encounter medications on file as of 9/20/2024.       Physical Exam:  Vitals:    09/20/24 1016   BP: 131/80   BP Location: Right arm   Patient Position: Sitting   BP Method: Large (Automatic)   Pulse: 83   Temp: 97.9 °F (36.6 °C)   TempSrc: Oral       Physical Exam   General: NAD, voice normal  Neuro: AAO, CN II - XII grossly intact  Head/ Face: NCAT, symmetric, sensations intact bilaterally  Eyes: EOMI, PERRL  Ears: externally normal with grossly normal hearing  AD: EAC with cerumen impaction- atraumatic removal under microscopy with suction, TM intact, no middle ear effusion, no retractions. Able to auto insufflate  AS: EAC patent, TM intact, no middle ear effusion, no retractions  Nose: bilateral nares patent, midline septum, no rhinorrhea, no external deformity, no turbinate hypertrophy  OC/OP: MMM, no intraoral lesions, no trismus, dentition is poor no uvular deviation, bilaterally symmetric soft palate elevation, palatoglossus and palatopharyngeal fold wnl; tonsils are symmetric and 1+  Indirect laryngoscopy: deferred due to patient intolerance  Neck: soft, supple, no LAD, normal ROM, no thyromegaly  Respiratory: nonlabored, no  wheezing, bilateral chest rise  Cardiovascular: RRR  Gastrointestinal: S NT ND  Skin: warm, no lesions  Musculoskeletal: 5/5 strength  Psych: Appropriate affect/mood     Pertinent Data:  ? LABS:  ? AUDIO:           ? PATH:      Imaging:   I personally reviewed the following images:        Assessment/Plan:  54 y.o. male with right cerumen impaction, removed today. Reassurance provided that eardrum is intact.   - F/u with outside audio as planned  - Baby oil weekly for ceruminolysis  - RTC PRN     Annette Gurrola NP

## 2024-09-20 ENCOUNTER — OFFICE VISIT (OUTPATIENT)
Dept: OTOLARYNGOLOGY | Facility: CLINIC | Age: 54
End: 2024-09-20

## 2024-09-20 VITALS — TEMPERATURE: 98 F | HEART RATE: 83 BPM | DIASTOLIC BLOOD PRESSURE: 80 MMHG | SYSTOLIC BLOOD PRESSURE: 131 MMHG

## 2024-09-20 DIAGNOSIS — H61.21 IMPACTED CERUMEN OF RIGHT EAR: Primary | ICD-10-CM

## 2024-09-20 DIAGNOSIS — H72.91 RUPTURED TYMPANIC MEMBRANE, RIGHT: ICD-10-CM

## 2024-09-20 DIAGNOSIS — H91.90 HEARING LOSS, UNSPECIFIED HEARING LOSS TYPE, UNSPECIFIED LATERALITY: ICD-10-CM

## 2024-09-20 PROCEDURE — 99213 OFFICE O/P EST LOW 20 MIN: CPT | Mod: PBBFAC | Performed by: NURSE PRACTITIONER

## 2025-01-08 DIAGNOSIS — Z80.0 FAMILY HISTORY OF COLON CANCER: Primary | ICD-10-CM

## 2025-01-08 RX ORDER — POLYETHYLENE GLYCOL 3350, SODIUM SULFATE, SODIUM CHLORIDE, POTASSIUM CHLORIDE, SODIUM ASCORBATE, AND ASCORBIC ACID 7.5-2.691G
KIT ORAL
Qty: 1 KIT | Refills: 0 | Status: SHIPPED | OUTPATIENT
Start: 2025-01-08

## 2025-02-13 ENCOUNTER — ANESTHESIA EVENT (OUTPATIENT)
Dept: ENDOSCOPY | Facility: HOSPITAL | Age: 55
End: 2025-02-13
Payer: MEDICAID

## 2025-02-13 ENCOUNTER — ANESTHESIA (OUTPATIENT)
Dept: ENDOSCOPY | Facility: HOSPITAL | Age: 55
End: 2025-02-13
Payer: MEDICAID

## 2025-02-13 ENCOUNTER — HOSPITAL ENCOUNTER (OUTPATIENT)
Facility: HOSPITAL | Age: 55
Discharge: HOME | End: 2025-02-13
Attending: INTERNAL MEDICINE | Admitting: INTERNAL MEDICINE
Payer: MEDICAID

## 2025-02-13 DIAGNOSIS — Z98.890 S/P COLONOSCOPY: Primary | ICD-10-CM

## 2025-02-13 DIAGNOSIS — K57.30 DIVERTICULOSIS LARGE INTESTINE W/O PERFORATION OR ABSCESS W/O BLEEDING: ICD-10-CM

## 2025-02-13 LAB — POCT GLUCOSE: 162 MG/DL (ref 70–110)

## 2025-02-13 PROCEDURE — 82962 GLUCOSE BLOOD TEST: CPT | Performed by: INTERNAL MEDICINE

## 2025-02-13 PROCEDURE — 45378 DIAGNOSTIC COLONOSCOPY: CPT | Performed by: INTERNAL MEDICINE

## 2025-02-13 PROCEDURE — 37000008 HC ANESTHESIA 1ST 15 MINUTES: Performed by: INTERNAL MEDICINE

## 2025-02-13 PROCEDURE — 37000009 HC ANESTHESIA EA ADD 15 MINS: Performed by: INTERNAL MEDICINE

## 2025-02-13 PROCEDURE — 63600175 PHARM REV CODE 636 W HCPCS: Performed by: NURSE ANESTHETIST, CERTIFIED REGISTERED

## 2025-02-13 RX ORDER — LIDOCAINE HYDROCHLORIDE 10 MG/ML
1 INJECTION, SOLUTION EPIDURAL; INFILTRATION; INTRACAUDAL; PERINEURAL ONCE
OUTPATIENT
Start: 2025-02-13 | End: 2025-02-13

## 2025-02-13 RX ORDER — SODIUM CHLORIDE, SODIUM LACTATE, POTASSIUM CHLORIDE, CALCIUM CHLORIDE 600; 310; 30; 20 MG/100ML; MG/100ML; MG/100ML; MG/100ML
INJECTION, SOLUTION INTRAVENOUS CONTINUOUS
OUTPATIENT
Start: 2025-02-13

## 2025-02-13 RX ORDER — LIDOCAINE HYDROCHLORIDE 20 MG/ML
INJECTION, SOLUTION EPIDURAL; INFILTRATION; INTRACAUDAL; PERINEURAL
Status: DISCONTINUED | OUTPATIENT
Start: 2025-02-13 | End: 2025-02-13

## 2025-02-13 RX ORDER — PROPOFOL 10 MG/ML
VIAL (ML) INTRAVENOUS
Status: DISCONTINUED | OUTPATIENT
Start: 2025-02-13 | End: 2025-02-13

## 2025-02-13 RX ADMIN — PROPOFOL 20 MG: 10 INJECTION, EMULSION INTRAVENOUS at 01:02

## 2025-02-13 RX ADMIN — PROPOFOL 10 MG: 10 INJECTION, EMULSION INTRAVENOUS at 01:02

## 2025-02-13 RX ADMIN — PROPOFOL 30 MG: 10 INJECTION, EMULSION INTRAVENOUS at 01:02

## 2025-02-13 RX ADMIN — LIDOCAINE HYDROCHLORIDE 100 MG: 20 INJECTION, SOLUTION EPIDURAL; INFILTRATION; INTRACAUDAL; PERINEURAL at 12:02

## 2025-02-13 RX ADMIN — PROPOFOL 30 MG: 10 INJECTION, EMULSION INTRAVENOUS at 12:02

## 2025-02-13 RX ADMIN — PROPOFOL 120 MG: 10 INJECTION, EMULSION INTRAVENOUS at 12:02

## 2025-02-13 RX ADMIN — PROPOFOL 20 MG: 10 INJECTION, EMULSION INTRAVENOUS at 12:02

## 2025-02-13 NOTE — PLAN OF CARE
Procedure complete -returned to room- awake alert- denies discomfort- tolerating soda at this time-family at bedside

## 2025-02-13 NOTE — DISCHARGE SUMMARY
[facility]  Discharge Note  Short Stay    Procedure(s) (LRB):  COLONOSCOPY (N/A)  The procedure of colonoscopy was explained to the patient and consent obtained.  The patient is transferred to the endoscopy suite, general IV anesthesia was provided by anesthesia Services.  Rectal exam was performed and normal.  The Olympus videocolonoscope was introduced per rectum and advanced around the colon to the cecum.  The ileocecal valve and appendiceal orifice were identified and normal, the distal terminal ileum inspected and noted to be normal.  Examination of the ascending, transverse and proximal descending colon revealed no abnormalities.  There were scattered varying size diverticula noted in the distal descending and sigmoid colon with no evidence of diverticulitis.  The rectum was normal including retroflexed view.  The endoscope was withdrawn, withdrawal time cecum to rectum 15 minutes.  The procedure was well tolerated and the patient returned to the recovery area for observation.      The patient will resume his normal diet today and normal activities tomorrow.  Because of family history a follow-up colonoscopy in 5 years is recommended.    OUTCOME: Patient tolerated treatment/procedure well without complication and is now ready for discharge.    DISPOSITION: Home or Self Care    FINAL DIAGNOSIS:  <principal problem not specified>    FOLLOWUP: With primary care provider    DISCHARGE INSTRUCTIONS:    Discharge Procedure Orders   Diet Adult Regular     Diet general     Call MD for:  temperature >100.4     Call MD for:  persistent nausea and vomiting     Call MD for:  severe uncontrolled pain     Call MD for:  difficulty breathing, headache or visual disturbances     Activity as tolerated         Clinical Reference Documents Added to Patient Instructions         Document    COLONOSCOPY DISCHARGE INSTRUCTIONS (ENGLISH)            TIME SPENT ON DISCHARGE: 5 minutes

## 2025-02-13 NOTE — ANESTHESIA PREPROCEDURE EVALUATION
"                                                                                                             02/13/2025  Taj Ceja is a 54 y.o., male with PMHx of obesity, HTN, HLD, CHRISTIAN, smoking, GERD, DM presents for screening colonoscopy.    NO BETA BLOCKER USE    Active Ambulatory Problems     Diagnosis Date Noted    Gastroesophageal reflux disease 06/06/2022    Hearing loss 06/06/2022    Hyperlipidemia 06/06/2022    Hypertension 06/06/2022    Obesity 06/06/2022    Obstructive sleep apnea syndrome 06/06/2022    Tobacco user 06/06/2022    Type 2 diabetes mellitus 06/06/2022    Tinea pedis 06/06/2022    Cervical muscle pain 06/06/2022    Family history of colon cancer 03/20/2024    Onychomycosis 09/18/2024     Resolved Ambulatory Problems     Diagnosis Date Noted    No Resolved Ambulatory Problems     No Additional Past Medical History       Pre-op Assessment    I have reviewed the NPO Status.      Review of Systems  Anesthesia Hx:  No problems with previous Anesthesia                Social:  Vaping, Smoker       Cardiovascular:     Hypertension, poorly controlled           hyperlipidemia                               Pulmonary:        Sleep Apnea, CPAP                Renal/:  Renal/ Normal                 Hepatic/GI:     GERD, well controlled                Neurological:  Neurology Normal                                      Endocrine:  Diabetes, well controlled, type 2         Obesity / BMI > 30    Vitals:    01/30/25 1450 02/13/25 1037   BP:  (!) 155/85   BP Location:  Left arm   Patient Position:  Lying   Pulse:  78   Resp:  16   Temp:  36.6 °C (97.9 °F)   TempSrc:  Oral   SpO2:  96%   Weight: 97.1 kg (214 lb) 100.3 kg (221 lb 3.2 oz)   Height: 5' 7" (1.702 m) 5' 7" (1.702 m)         Physical Exam  General: Alert, Cooperative and Well nourished    Airway:  Mallampati: II   Mouth Opening: Normal  TM Distance: Normal  Tongue: Normal  Neck ROM: Normal ROM    Dental:  Intact    Chest/Lungs:  Clear to " auscultation, Normal Respiratory Rate    Heart:  Rate: Normal  Rhythm: Regular Rhythm  Sounds: Normal       Latest Reference Range & Units 02/13/25 10:37   POCT Glucose 70 - 110 mg/dL 162 (H)   (H): Data is abnormally high  Lab Results   Component Value Date    WBC 6.89 08/24/2023    HGB 17.5 08/24/2023    HCT 52.2 (H) 08/24/2023    MCV 90.8 08/24/2023     08/24/2023       CMP  Sodium   Date Value Ref Range Status   09/18/2024 138 136 - 145 mmol/L Final     Potassium   Date Value Ref Range Status   09/18/2024 3.9 3.5 - 5.1 mmol/L Final     Chloride   Date Value Ref Range Status   09/18/2024 106 98 - 107 mmol/L Final     CO2   Date Value Ref Range Status   09/18/2024 25 22 - 29 mmol/L Final     Blood Urea Nitrogen   Date Value Ref Range Status   09/18/2024 23.6 8.4 - 25.7 mg/dL Final     Creatinine   Date Value Ref Range Status   09/18/2024 1.22 (H) 0.73 - 1.18 mg/dL Final     Calcium   Date Value Ref Range Status   09/18/2024 9.1 8.4 - 10.2 mg/dL Final     Albumin   Date Value Ref Range Status   09/18/2024 3.8 3.5 - 5.0 g/dL Final     Bilirubin Total   Date Value Ref Range Status   09/18/2024 1.3 <=1.5 mg/dL Final     ALP   Date Value Ref Range Status   09/18/2024 91 40 - 150 unit/L Final     AST   Date Value Ref Range Status   09/18/2024 21 5 - 34 unit/L Final     ALT   Date Value Ref Range Status   09/18/2024 21 0 - 55 unit/L Final     eGFR   Date Value Ref Range Status   09/18/2024 >60 mL/min/1.73/m2 Final     Lab Results   Component Value Date    HGBA1C 6.9 09/18/2024         Anesthesia Plan  Type of Anesthesia, risks & benefits discussed:    Anesthesia Type: Gen Natural Airway  Intra-op Monitoring Plan: Standard ASA Monitors  Post Op Pain Control Plan: IV/PO Opioids PRN  Induction:  IV  Informed Consent: Informed consent signed with the Patient and all parties understand the risks and agree with anesthesia plan.  All questions answered.   ASA Score: 3  Day of Surgery Review of History & Physical: H&P  Update referred to the surgeon/provider.    Ready For Surgery From Anesthesia Perspective.     .

## 2025-02-13 NOTE — H&P
[facility]  Pre-OP H&P    SUBJECTIVE:  Taj Ceja is a 54 y.o. y/o male w/ PMHx of obesity, htn, HLD, GERD, DM,  who presents today for first screening Colonoscopy. Notable history of brother who developed colon cancer in his 20's.       OBJECTIVE:  Vitals:    02/13/25 1037   BP: (!) 155/85   Pulse: 78   Resp: 16   Temp: 97.9 °F (36.6 °C)       General: male in NAD. Not toxic appearing.   HENT: EOM intact.   Pulmonary: No respiratory distress. Effort normal.  Cardiovascular: Regular rate.   Abdomen: soft, non-tender, non-distended          ASSESSMENT/PLAN:    Taj Ceja is a 54 y.o. y/o male who presents today for above stated scheduled procedure     -All questions answered, patient consented   -Patient consented for screening colonoscopy         Sangeetha Jha MD  [employer] [facility]

## 2025-02-13 NOTE — ANESTHESIA POSTPROCEDURE EVALUATION
Anesthesia Post Evaluation    Patient: Taj Ceja    Procedure(s) Performed: Procedure(s) (LRB):  COLONOSCOPY (N/A)    Final Anesthesia Type: general      Patient location during evaluation: GI PACU  Patient participation: Yes- Able to Participate  Level of consciousness: awake and alert  Post-procedure vital signs: reviewed and stable  Pain management: adequate  Airway patency: patent    PONV status at discharge: No PONV  Anesthetic complications: no      Cardiovascular status: hemodynamically stable  Respiratory status: spontaneous ventilation  Hydration status: euvolemic  Follow-up not needed.              Vitals Value Taken Time   /85 02/13/25 1037   Temp 36.6 °C (97.9 °F) 02/13/25 1037   Pulse 78 02/13/25 1037   Resp 16 02/13/25 1037   SpO2 96 % 02/13/25 1037         No case tracking events are documented in the log.      Pain/Ward Score: No data recorded

## 2025-02-13 NOTE — TRANSFER OF CARE
"Anesthesia Transfer of Care Note    Patient: Taj Ceja    Procedure(s) Performed: Procedure(s) (LRB):  COLONOSCOPY (N/A)    Patient location: GI    Anesthesia Type: general    Transport from OR: Transported from OR on room air with adequate spontaneous ventilation    Post pain: adequate analgesia    Post assessment: no apparent anesthetic complications    Post vital signs: stable    Level of consciousness: sedated    Nausea/Vomiting: no nausea/vomiting    Complications: none    Transfer of care protocol was followed      Last vitals: Visit Vitals  BP (!) 155/85 (BP Location: Left arm, Patient Position: Lying)   Pulse 78   Temp 36.6 °C (97.9 °F) (Oral)   Resp 16   Ht 5' 7" (1.702 m)   Wt 100.3 kg (221 lb 3.2 oz)   SpO2 96%   BMI 34.64 kg/m²     "

## 2025-02-13 NOTE — [employer] PATIENT INSTRUCTIONS
Discharge Summary/Instructions after an Endoscopic Procedure  Patient Name: Taj Ceja  Patient MRN: [id number]  Patient YOB: 1970  Thursday, February 13, 2025  Ab Osorio MD  Dear patient,  As a result of recent federal legislation (The Federal Cures Act), you may   receive lab or pathology results from your procedure in your [facility]   account before your physician is able to contact you. Your physician or   their representative will relay the results to you with their   recommendations at their soonest availability.  Thank you,  RESTRICTIONS:  During your procedure today, you received medications for sedation.  These   medications may affect your judgment, balance and coordination.  Therefore,   for 24 hours, you have the following restrictions:   - DO NOT drive a car, operate machinery, make legal/financial decisions,   sign important papers or drink alcohol.    ACTIVITY:  Today: no heavy lifting, straining or running due to procedural   sedation/anesthesia.  The following day: return to full activity including work.  DIET:  Eat and drink normally unless instructed otherwise.     TREATMENT FOR COMMON SIDE EFFECTS:  - Mild abdominal pain, nausea, belching, bloating or excessive gas:  rest,   eat lightly and use a heating pad.  - Sore Throat: treat with throat lozenges and/or gargle with warm salt   water.  - Because air was used during the procedure, expelling large amounts of air   from your rectum or belching is normal.  - If a bowel prep was taken, you may not have a bowel movement for 1-3 days.    This is normal.  SYMPTOMS TO WATCH FOR AND REPORT TO YOUR PHYSICIAN:  1. Abdominal pain or bloating, other than gas cramps.  2. Chest pain.  3. Back pain.  4. Signs of infection such as: chills or fever occurring within 24 hours   after the procedure.  5. Rectal bleeding, which would show as bright red, maroon, or black stools.   (A tablespoon of blood from the rectum is not serious, especially  if   hemorrhoids are present.)  6. Vomiting.  7. Weakness or dizziness.  GO DIRECTLY TO THE NEAREST EMERGENCY ROOM IF YOU HAVE ANY OF THE FOLLOWING:      Difficulty breathing              Chills and/or fever over 101 F   Persistent vomiting and/or vomiting blood   Severe abdominal pain   Severe chest pain   Black, tarry stools   Bleeding- more than one tablespoon   Any other symptom or condition that you feel may need urgent attention  Your doctor recommends these additional instructions:  If any biopsies were taken, your doctors clinic will contact you in 1 to 2   weeks with any results.  Recommendations:  - Discharge patient to home (ambulatory).   - Resume previous diet today.   - Repeat colonoscopy in 5 years for surveillance.   - Continue present medications.   - Patient has a contact number available for emergencies.  The signs and   symptoms of potential delayed complications were discussed with the   patient.  Return to normal activities tomorrow.  Written discharge   instructions were provided to the patient.  Impressions:  - Diverticulosis in the sigmoid colon and in the descending colon.   - The entire examined colon is normal on direct and retroflexion views.   - No specimens collected.  For questions, problems or results please call your physician - Ab Osorio MD at Work:  [phone].  [facility] , EMERGENCY ROOM PHONE NUMBER: [phone]  IF A COMPLICATION OR EMERGENCY SITUATION ARISES AND YOU ARE UNABLE TO REACH   YOUR PHYSICIAN - GO DIRECTLY TO THE EMERGENCY ROOM.  MD Ab Don MD  2/13/2025 1:36:27 PM  This report has been verified and signed electronically.  Dear patient,  As a result of recent federal legislation (The Federal Cures Act), you may   receive lab or pathology results from your procedure in your [facility]   account before your physician is able to contact you. Your physician or   their representative will relay the results to  you with their   recommendations at their soonest availability.  Thank you,  [employer]ATION

## 2025-02-14 VITALS
HEIGHT: 67 IN | BODY MASS INDEX: 34.72 KG/M2 | OXYGEN SATURATION: 98 % | HEART RATE: 71 BPM | DIASTOLIC BLOOD PRESSURE: 87 MMHG | SYSTOLIC BLOOD PRESSURE: 150 MMHG | RESPIRATION RATE: 18 BRPM | WEIGHT: 221.19 LBS | TEMPERATURE: 98 F

## 2025-03-27 ENCOUNTER — OFFICE VISIT (OUTPATIENT)
Dept: INTERNAL MEDICINE | Facility: CLINIC | Age: 55
End: 2025-03-27

## 2025-03-27 ENCOUNTER — LAB VISIT (OUTPATIENT)
Dept: LAB | Facility: HOSPITAL | Age: 55
End: 2025-03-27
Attending: NURSE PRACTITIONER

## 2025-03-27 VITALS
DIASTOLIC BLOOD PRESSURE: 78 MMHG | SYSTOLIC BLOOD PRESSURE: 128 MMHG | OXYGEN SATURATION: 95 % | HEIGHT: 67 IN | HEART RATE: 77 BPM | WEIGHT: 223.19 LBS | BODY MASS INDEX: 35.03 KG/M2 | TEMPERATURE: 98 F | RESPIRATION RATE: 18 BRPM

## 2025-03-27 DIAGNOSIS — E78.2 MIXED HYPERLIPIDEMIA: ICD-10-CM

## 2025-03-27 DIAGNOSIS — Z23 NEEDS FLU SHOT: ICD-10-CM

## 2025-03-27 DIAGNOSIS — L21.9 SEBORRHEIC DERMATITIS: ICD-10-CM

## 2025-03-27 DIAGNOSIS — E11.9 TYPE 2 DIABETES MELLITUS WITHOUT COMPLICATION, WITHOUT LONG-TERM CURRENT USE OF INSULIN: ICD-10-CM

## 2025-03-27 DIAGNOSIS — I10 PRIMARY HYPERTENSION: ICD-10-CM

## 2025-03-27 DIAGNOSIS — Z12.5 PROSTATE CANCER SCREENING: ICD-10-CM

## 2025-03-27 DIAGNOSIS — Z80.0 FAMILY HISTORY OF COLON CANCER: ICD-10-CM

## 2025-03-27 DIAGNOSIS — E66.9 OBESITY, UNSPECIFIED CLASS, UNSPECIFIED OBESITY TYPE, UNSPECIFIED WHETHER SERIOUS COMORBIDITY PRESENT: ICD-10-CM

## 2025-03-27 DIAGNOSIS — E11.9 TYPE 2 DIABETES MELLITUS WITHOUT COMPLICATION, WITHOUT LONG-TERM CURRENT USE OF INSULIN: Primary | ICD-10-CM

## 2025-03-27 DIAGNOSIS — G47.33 OBSTRUCTIVE SLEEP APNEA SYNDROME: ICD-10-CM

## 2025-03-27 DIAGNOSIS — K57.30 DIVERTICULOSIS LARGE INTESTINE W/O PERFORATION OR ABSCESS W/O BLEEDING: ICD-10-CM

## 2025-03-27 LAB
ALBUMIN SERPL-MCNC: 4.1 G/DL (ref 3.5–5)
ALBUMIN/GLOB SERPL: 1.1 RATIO (ref 1.1–2)
ALP SERPL-CCNC: 90 UNIT/L (ref 40–150)
ALT SERPL-CCNC: 25 UNIT/L (ref 0–55)
ANION GAP SERPL CALC-SCNC: 10 MEQ/L
AST SERPL-CCNC: 21 UNIT/L (ref 11–45)
BASOPHILS # BLD AUTO: 0.05 X10(3)/MCL
BASOPHILS NFR BLD AUTO: 0.9 %
BILIRUB SERPL-MCNC: 2.1 MG/DL
BUN SERPL-MCNC: 20.2 MG/DL (ref 8.4–25.7)
CALCIUM SERPL-MCNC: 8.8 MG/DL (ref 8.4–10.2)
CHLORIDE SERPL-SCNC: 101 MMOL/L (ref 98–107)
CHOLEST SERPL-MCNC: 132 MG/DL
CHOLEST/HDLC SERPL: 4 {RATIO} (ref 0–5)
CO2 SERPL-SCNC: 26 MMOL/L (ref 22–29)
CREAT SERPL-MCNC: 1.08 MG/DL (ref 0.72–1.25)
CREAT/UREA NIT SERPL: 19
EOSINOPHIL # BLD AUTO: 0.09 X10(3)/MCL (ref 0–0.9)
EOSINOPHIL NFR BLD AUTO: 1.7 %
ERYTHROCYTE [DISTWIDTH] IN BLOOD BY AUTOMATED COUNT: 12.8 % (ref 11.5–17)
EST. AVERAGE GLUCOSE BLD GHB EST-MCNC: 182.9 MG/DL
GFR SERPLBLD CREATININE-BSD FMLA CKD-EPI: >60 ML/MIN/1.73/M2
GLOBULIN SER-MCNC: 3.6 GM/DL (ref 2.4–3.5)
GLUCOSE SERPL-MCNC: 120 MG/DL (ref 74–100)
HBA1C MFR BLD: 8 %
HCT VFR BLD AUTO: 51.1 % (ref 42–52)
HDLC SERPL-MCNC: 35 MG/DL (ref 35–60)
HGB BLD-MCNC: 17.8 G/DL (ref 14–18)
IMM GRANULOCYTES # BLD AUTO: 0.02 X10(3)/MCL (ref 0–0.04)
IMM GRANULOCYTES NFR BLD AUTO: 0.4 %
LDLC SERPL CALC-MCNC: 71 MG/DL (ref 50–140)
LYMPHOCYTES # BLD AUTO: 2.01 X10(3)/MCL (ref 0.6–4.6)
LYMPHOCYTES NFR BLD AUTO: 37.9 %
MCH RBC QN AUTO: 30.8 PG (ref 27–31)
MCHC RBC AUTO-ENTMCNC: 34.8 G/DL (ref 33–36)
MCV RBC AUTO: 88.4 FL (ref 80–94)
MONOCYTES # BLD AUTO: 0.41 X10(3)/MCL (ref 0.1–1.3)
MONOCYTES NFR BLD AUTO: 7.7 %
NEUTROPHILS # BLD AUTO: 2.72 X10(3)/MCL (ref 2.1–9.2)
NEUTROPHILS NFR BLD AUTO: 51.4 %
NRBC BLD AUTO-RTO: 0 %
PLATELET # BLD AUTO: 234 X10(3)/MCL (ref 130–400)
PMV BLD AUTO: 9.1 FL (ref 7.4–10.4)
POTASSIUM SERPL-SCNC: 4.1 MMOL/L (ref 3.5–5.1)
PROT SERPL-MCNC: 7.7 GM/DL (ref 6.4–8.3)
PSA SERPL-MCNC: 0.3 NG/ML
RBC # BLD AUTO: 5.78 X10(6)/MCL (ref 4.7–6.1)
SODIUM SERPL-SCNC: 137 MMOL/L (ref 136–145)
TRIGL SERPL-MCNC: 129 MG/DL (ref 34–140)
VLDLC SERPL CALC-MCNC: 26 MG/DL
WBC # BLD AUTO: 5.3 X10(3)/MCL (ref 4.5–11.5)

## 2025-03-27 PROCEDURE — 80053 COMPREHEN METABOLIC PANEL: CPT

## 2025-03-27 PROCEDURE — 99214 OFFICE O/P EST MOD 30 MIN: CPT | Mod: PBBFAC | Performed by: NURSE PRACTITIONER

## 2025-03-27 PROCEDURE — 99214 OFFICE O/P EST MOD 30 MIN: CPT | Mod: S$PBB,,, | Performed by: NURSE PRACTITIONER

## 2025-03-27 PROCEDURE — 80061 LIPID PANEL: CPT

## 2025-03-27 PROCEDURE — 84153 ASSAY OF PSA TOTAL: CPT

## 2025-03-27 PROCEDURE — 85025 COMPLETE CBC W/AUTO DIFF WBC: CPT

## 2025-03-27 PROCEDURE — 36415 COLL VENOUS BLD VENIPUNCTURE: CPT

## 2025-03-27 PROCEDURE — 83036 HEMOGLOBIN GLYCOSYLATED A1C: CPT

## 2025-03-27 RX ORDER — METFORMIN HYDROCHLORIDE 500 MG/1
1000 TABLET, EXTENDED RELEASE ORAL 2 TIMES DAILY
Qty: 360 TABLET | Refills: 2 | Status: SHIPPED | OUTPATIENT
Start: 2025-03-27

## 2025-03-27 RX ORDER — GLIPIZIDE 5 MG/1
5 TABLET, FILM COATED, EXTENDED RELEASE ORAL
Qty: 90 TABLET | Refills: 2 | Status: SHIPPED | OUTPATIENT
Start: 2025-03-27 | End: 2026-03-27

## 2025-03-27 RX ORDER — KETOCONAZOLE 20 MG/ML
SHAMPOO, SUSPENSION TOPICAL
Qty: 120 ML | Refills: 6 | Status: SHIPPED | OUTPATIENT
Start: 2025-03-27

## 2025-03-27 RX ORDER — LOSARTAN POTASSIUM 50 MG/1
50 TABLET ORAL DAILY
Qty: 90 TABLET | Refills: 2 | Status: SHIPPED | OUTPATIENT
Start: 2025-03-27

## 2025-03-27 RX ORDER — ATORVASTATIN CALCIUM 10 MG/1
10 TABLET, FILM COATED ORAL NIGHTLY
Qty: 90 TABLET | Refills: 2 | Status: SHIPPED | OUTPATIENT
Start: 2025-03-27

## 2025-03-27 RX ORDER — AMLODIPINE BESYLATE 10 MG/1
10 TABLET ORAL DAILY
Qty: 90 TABLET | Refills: 2 | Status: SHIPPED | OUTPATIENT
Start: 2025-03-27

## 2025-03-27 NOTE — ASSESSMENT & PLAN NOTE
BMI 34.95, wt gain 18# since last visit 9/2024  Educated on increased risk of disease s/t obesity.  Educated on health benefits of at least 5 days/ week of 30 minutes moderate intensity exercise (brisk walking) and 2 or more days/ week of muscle strength activities (as tolerated).  Eat well balanced diet of fresh fruits/ vegetables, whole grains, lean meats and limit high carbohydrate foods.

## 2025-03-27 NOTE — PATIENT INSTRUCTIONS
Please fax eye records to [phone]     You can get flu shot at local pharmacy and /or local health unit

## 2025-03-27 NOTE — ASSESSMENT & PLAN NOTE
Lab Results   Component Value Date    CHOL 132 03/27/2025     Lab Results   Component Value Date    HDL 35 03/27/2025     Lab Results   Component Value Date    TRIG 129 03/27/2025     Lab Results   Component Value Date    LDL 71.00 03/27/2025   LDL goal < 70  Avoid tobacco/ alcohol  Follow low fat/low cholesterol diet such as avoid/ decrease finnegan, sausage, fried foods, cookies, cakes, chips, cheese, whole milk, butter, mayonnaise. Add olive oil, avocados, lean meats, fresh fruits/ vegetables, heart healthy nuts to diet.  Educated on health benefits of exercise 5 days/ week of at least 30 minutes moderate intensity exercise (brisk walking) and 2 days/ week of muscle strength activities  Daily ASA 81 mg for CV prevention  Continue current medication regimen

## 2025-03-27 NOTE — PROGRESS NOTES
Bruna L Kim, NP   [facility]  [address] [facility] [id number]      PATIENT NAME: Taj Ceja  : 1970  DATE: 3/27/25  MRN: [id number]        History of Present Illness / Problem Focused Workflow     Taj Ceja presents to the clinic with Results, Diabetes, Follow-up, and Arm Pain (C/O left shoulder pain for couple weeks)     54 yo male for follow up/ lab review. Last seen 24. Past medical history of tobacco abuse. Active diagnosis [place], HLD, DM, left ear hearing loss, hearing aids, tinnitus, CHRISTIAN on CPAP, obesity. /78. Labs completed. A1c 8%. Last eye exam at [facility]. LDL 71. Stable renal functions/ blood counts. PSA 0.3. Reports needs med refills, 90 day supplies. He states he has been getting only 30 day supplies from pharmacy though last scripts sent 2024 were for 90 day supply. Wt gain 18# noted. He states he has been eating different foods. Denies any other concerns/ complaints.     Screenings  PSA 0.3 3/27/25  Colonoscopy 2025- diverticulosis, recall 5 years     Other providers  [facility]     Review of Systems     Review of Systems   Constitutional: Negative.    HENT: Negative.     Eyes: Negative.    Respiratory: Negative.     Cardiovascular: Negative.    Gastrointestinal: Negative.    Endocrine: Negative.    Genitourinary: Negative.    Musculoskeletal: Negative.    Skin: Negative.    Allergic/Immunologic: Negative.    Neurological: Negative.    Hematological: Negative.    Psychiatric/Behavioral: Negative.         Medical / Social / Family History     -------------------------------------    Diabetes mellitus        Past Surgical History:   Procedure Laterality Date    COLONOSCOPY N/A 2025    Procedure: COLONOSCOPY;  Surgeon: Ab Osorio MD;  Location: [facility] ENDOSCOPY;  Service: Endoscopy;  Laterality: N/A;    Denies         Social History[1]     Family History   Problem Relation Name Age of Onset     "Diabetes Father      Gout Father      Hypertension Father      Alcohol abuse Father      Colon cancer Brother          Medications and Allergies     Medications  Current Outpatient Medications   Medication Instructions    amLODIPine (NORVASC) 10 mg, Oral, Daily    aspirin (ECOTRIN) 81 mg, Daily    atorvastatin (LIPITOR) 10 mg, Oral, Nightly    ciclopirox (PENLAC) 8 % Soln Topical (Top), Nightly    empagliflozin (JARDIANCE) 25 mg, Oral, Daily    glipiZIDE 5 mg, Oral, With breakfast    ketoconazole (NIZORAL) 2 % shampoo Topical (Top), Twice weekly    losartan (COZAAR) 50 mg, Oral, Daily    metFORMIN (GLUCOPHAGE-XR) 1,000 mg, Oral, 2 times daily    ofloxacin (OCUFLOX) 0.3 % ophthalmic solution 3 drops, 2 times daily    polyethylene glycol (MOVIPREP) 100-7.5-2.691 gram solution Take as directed prior to colonoscopy    terbinafine HCL (LAMISIL AT) 1 % cream Topical (Top), 2 times daily       Allergies  Review of patient's allergies indicates:  No Known Allergies    Physical Examination     Visit Vitals  /78 (BP Location: Left arm, Patient Position: Sitting)   Pulse 77   Temp 97.7 °F (36.5 °C) (Oral)   Resp 18   Ht 5' 7.01" (1.702 m)   Wt 101.2 kg (223 lb 3.2 oz)   SpO2 95%   BMI 34.95 kg/m²       Physical Exam  Constitutional:       General: He is not in acute distress.     Appearance: Normal appearance. He is obese. He is not ill-appearing, toxic-appearing or diaphoretic.   HENT:      Head: Normocephalic.   Cardiovascular:      Rate and Rhythm: Normal rate and regular rhythm.   Pulmonary:      Effort: Pulmonary effort is normal. No respiratory distress.      Breath sounds: Normal breath sounds. No stridor. No wheezing, rhonchi or rales.   Skin:     General: Skin is warm and dry.      Findings: Erythema and rash present. Rash is macular and scaling.      Comments: Generalized erythematous, macular scaling noted throughout scalp and beard    Neurological:      General: No focal deficit present.      Mental Status: " He is alert and oriented to person, place, and time. Mental status is at baseline.      Motor: No weakness.      Coordination: Coordination normal.      Gait: Gait normal.   Psychiatric:         Mood and Affect: Mood normal.         Behavior: Behavior normal.         Thought Content: Thought content normal.         Judgment: Judgment normal.           Results     Lab Results   Component Value Date    WBC 5.30 03/27/2025    RBC 5.78 03/27/2025    HGB 17.8 03/27/2025    HCT 51.1 03/27/2025    MCV 88.4 03/27/2025    MCH 30.8 03/27/2025    MCHC 34.8 03/27/2025    RDW 12.8 03/27/2025     03/27/2025    MPV 9.1 03/27/2025     Sodium   Date Value Ref Range Status   03/27/2025 137 136 - 145 mmol/L Final     Potassium   Date Value Ref Range Status   03/27/2025 4.1 3.5 - 5.1 mmol/L Final     Chloride   Date Value Ref Range Status   03/27/2025 101 98 - 107 mmol/L Final     CO2   Date Value Ref Range Status   03/27/2025 26 22 - 29 mmol/L Final     Blood Urea Nitrogen   Date Value Ref Range Status   03/27/2025 20.2 8.4 - 25.7 mg/dL Final     Creatinine   Date Value Ref Range Status   03/27/2025 1.08 0.72 - 1.25 mg/dL Final     Calcium   Date Value Ref Range Status   03/27/2025 8.8 8.4 - 10.2 mg/dL Final     Albumin   Date Value Ref Range Status   03/27/2025 4.1 3.5 - 5.0 g/dL Final     Bilirubin Total   Date Value Ref Range Status   03/27/2025 2.1 (H) <=1.5 mg/dL Final     ALP   Date Value Ref Range Status   03/27/2025 90 40 - 150 unit/L Final     AST   Date Value Ref Range Status   03/27/2025 21 11 - 45 unit/L Final     ALT   Date Value Ref Range Status   03/27/2025 25 0 - 55 unit/L Final     Estimated GFR-Non    Date Value Ref Range Status   06/06/2022 >60 mls/min/1.73/m2 Final     Lab Results   Component Value Date    CHOL 132 03/27/2025     Lab Results   Component Value Date    HDL 35 03/27/2025     Lab Results   Component Value Date    TRIG 129 03/27/2025     Lab Results   Component Value Date    LDL  71.00 03/27/2025     Lab Results   Component Value Date    TSH 2.5635 06/06/2022     Lab Results   Component Value Date    PHUR 7.0 05/25/2020    PROTEINUA Negative 05/25/2020    GLUCUA 300 (A) 05/25/2020    KETONESU Negative 05/25/2020    OCCULTUA Negative 05/25/2020    NITRITE Negative 05/25/2020    LEUKOCYTESUR Negative 05/25/2020     Lab Results   Component Value Date    HGBA1C 8.0 (H) 03/27/2025    HGBA1C 6.9 09/18/2024    HGBA1C 7.2 (H) 03/19/2024     Lab Results   Component Value Date    MICALBCREAT  09/18/2024      Comment:      Unable to calculate        Assessment       ICD-10-CM ICD-9-CM   1. Type 2 diabetes mellitus without complication, without long-term current use of insulin  E11.9 250.00   2. Needs flu shot  Z23 V04.81   3. Mixed hyperlipidemia  E78.2 272.2   4. Primary hypertension  I10 401.9   5. Obesity, unspecified class, unspecified obesity type, unspecified whether serious comorbidity present  E66.9 278.00   6. Seborrheic dermatitis  L21.9 690.10   7. Family history of colon cancer  Z80.0 V16.0   8. Diverticulosis large intestine w/o perforation or abscess w/o bleeding  K57.30 562.10       Plan       Problem List Items Addressed This Visit          Derm    Seborrheic dermatitis    Current Assessment & Plan   Pt with erythematous, itchy, flaky scalp and beard.   Rx as directed. If unable affordable, pt can try otc selsun blue shampoo         Relevant Medications    ketoconazole (NIZORAL) 2 % shampoo       Cardiac/Vascular    Hyperlipidemia    Overview   Atorvastatin 10 mg         Current Assessment & Plan   Lab Results   Component Value Date    CHOL 132 03/27/2025     Lab Results   Component Value Date    HDL 35 03/27/2025     Lab Results   Component Value Date    TRIG 129 03/27/2025     Lab Results   Component Value Date    LDL 71.00 03/27/2025   LDL goal < 70  Avoid tobacco/ alcohol  Follow low fat/low cholesterol diet such as avoid/ decrease finnegan, sausage, fried foods, cookies, cakes, chips,  cheese, whole milk, butter, mayonnaise. Add olive oil, avocados, lean meats, fresh fruits/ vegetables, heart healthy nuts to diet.  Educated on health benefits of exercise 5 days/ week of at least 30 minutes moderate intensity exercise (brisk walking) and 2 days/ week of muscle strength activities  Daily ASA 81 mg for CV prevention  Continue current medication regimen           Relevant Medications    atorvastatin (LIPITOR) 10 MG tablet    Hypertension    Overview   Current medication amlodipine 10 mg, losartan 50 mg         Current Assessment & Plan   BP Readings from Last 3 Encounters:   03/27/25 128/78   02/13/25 (!) 150/87   09/20/24 131/80     Follow low sodium diet, < 2 gm/day (avoid high salty foods such as processed meats/ sausage/finnegan/ sandwich meat, chips, pickles, cheese, crackers and soft drinks/ electrolyte replacement drinks).  Avoid tobacco/ alcohol use  Educated on health benefits of at least 5 days/ week of 30 minutes moderate intensity exercise (brisk walking) and 2 or more days/ week of muscle strength activities  Daily ASA 81 mg for CV prevention  Continue current medication regimen           Relevant Medications    amLODIPine (NORVASC) 10 MG tablet    losartan (COZAAR) 50 MG tablet       Oncology    Family history of colon cancer    Overview   Colonoscopy 2/2025 diverticulosis, recall 5 years          Current Assessment & Plan   Colonoscopy UTD            Endocrine    Obesity    Current Assessment & Plan   BMI 34.95, wt gain 18# since last visit 9/2024  Educated on increased risk of disease s/t obesity.  Educated on health benefits of at least 5 days/ week of 30 minutes moderate intensity exercise (brisk walking) and 2 or more days/ week of muscle strength activities (as tolerated).  Eat well balanced diet of fresh fruits/ vegetables, whole grains, lean meats and limit high carbohydrate foods.            Type 2 diabetes mellitus - Primary    Current Assessment & Plan   Lab Results   Component  Value Date    HGBA1C 8.0 (H) 03/27/2025     CBGs 120-170 per pt  Hypoglycemia: denies  Lab Results   Component Value Date    MICALBCREAT  09/18/2024      Comment:      Unable to calculate       Educated on ADA diet:  1. Avoid/ decrease high carbohydrate foods (rice, pasta, bread, candy, sweets, carbonated beverages)  Educated on health benefits of at least 5 days/ week of 30 minutes moderate intensity exercise (brisk walking) and 2 or more days/ week of muscle strength activities  Avoid alcohol or tobacco use if applicable  Eye exam:9/18/24 ordered - no report noted; he had external eye exam at [facility] and instructed to have copy faxed to clinic for review (pt self pay)  Foot exam: 9/18/24  Continue ASA, statin, ARB   Continue medications: Jardiance 25 mg, Metformin ER 1000 mg BID   Increase Glipizide 5 mg once daily   Dietary modifications and exercise emphasized  FU 3 mo with labs          Relevant Medications    atorvastatin (LIPITOR) 10 MG tablet    empagliflozin (JARDIANCE) 25 mg tablet    losartan (COZAAR) 50 MG tablet    metFORMIN (GLUCOPHAGE-XR) 500 MG ER 24hr tablet    glipiZIDE 5 MG TR24       GI    Diverticulosis large intestine w/o perforation or abscess w/o bleeding    Current Assessment & Plan   Colonoscopy 2/2025 diverticulosis, recall 5 years           Other Visit Diagnoses         Needs flu shot      Pt will get at local pharmacy/ health unit for lower cost.             Future Appointments   Date Time Provider Department Center   7/2/2025 10:20 AM Bruna Alvarez NP Formerly named [facility]        Follow up in about 3 months (around 6/27/2025) for HTN, HLD, DM with fasting labs before visit .    Signature:  Bruna Alvarez NP  [facility]  [address] [id number]    Date of encounter: 3/27/25       [1]   Social History  Socioeconomic History    Marital status:    Tobacco Use    Smoking status: Some Days      Current packs/day: 0.00     Average packs/day: 1 pack/day for 12.0 years (12.0 ttl pk-yrs)     Types: Cigarettes, Vaping w/o nicotine     Start date: 3/1/2010     Last attempt to quit: 3/1/2022     Years since quitting: 3.0    Smokeless tobacco: Never   Substance and Sexual Activity    Alcohol use: Not Currently     Alcohol/week: 3.0 standard drinks of alcohol     Types: 3 Cans of beer per week     Comment: Several times a month-beer    Drug use: Not Currently    Sexual activity: Yes     Partners: Female     Social Drivers of Health     Financial Resource Strain: Medium Risk (3/19/2024)    Overall Financial Resource Strain (CARDIA)     Difficulty of Paying Living Expenses: Somewhat hard   Food Insecurity: No Food Insecurity (3/19/2024)    Hunger Vital Sign     Worried About Running Out of Food in the Last Year: Never true     Ran Out of Food in the Last Year: Never true   Transportation Needs: No Transportation Needs (3/19/2024)    PRAPARE - Transportation     Lack of Transportation (Medical): No     Lack of Transportation (Non-Medical): No   Physical Activity: Unknown (3/19/2024)    Exercise Vital Sign     Days of Exercise per Week: Patient declined   Stress: No Stress Concern Present (3/19/2024)    [employer] - Occupational Stress Questionnaire     Feeling of Stress : Not at all   Housing Stability: Low Risk  (3/19/2024)    Housing Stability Vital Sign     Unable to Pay for Housing in the Last Year: No     Number of Places Lived in the Last Year: 1     Unstable Housing in the Last Year: No

## 2025-03-27 NOTE — ASSESSMENT & PLAN NOTE
Lab Results   Component Value Date    HGBA1C 8.0 (H) 03/27/2025     CBGs 120-170 per pt  Hypoglycemia: denies  Lab Results   Component Value Date    MICALBCREAT  09/18/2024      Comment:      Unable to calculate       Educated on ADA diet:  1. Avoid/ decrease high carbohydrate foods (rice, pasta, bread, candy, sweets, carbonated beverages)  Educated on health benefits of at least 5 days/ week of 30 minutes moderate intensity exercise (brisk walking) and 2 or more days/ week of muscle strength activities  Avoid alcohol or tobacco use if applicable  Eye exam:9/18/24 ordered - no report noted; he had external eye exam at [facility] and instructed to have copy faxed to clinic for review (pt self pay)  Foot exam: 9/18/24  Continue ASA, statin, ARB   Continue medications: Jardiance 25 mg, Metformin ER 1000 mg BID   Increase Glipizide 5 mg once daily   Dietary modifications and exercise emphasized

## 2025-03-27 NOTE — ASSESSMENT & PLAN NOTE
Pt with erythematous, itchy, flaky scalp and beard.   Rx as directed. If unable affordable, pt can try otc selsun blue shampoo

## 2025-03-27 NOTE — ASSESSMENT & PLAN NOTE
BP Readings from Last 3 Encounters:   03/27/25 128/78   02/13/25 (!) 150/87   09/20/24 131/80     Follow low sodium diet, < 2 gm/day (avoid high salty foods such as processed meats/ sausage/finnegan/ sandwich meat, chips, pickles, cheese, crackers and soft drinks/ electrolyte replacement drinks).  Avoid tobacco/ alcohol use  Educated on health benefits of at least 5 days/ week of 30 minutes moderate intensity exercise (brisk walking) and 2 or more days/ week of muscle strength activities  Daily ASA 81 mg for CV prevention  Continue current medication regimen

## 2025-08-27 ENCOUNTER — LAB VISIT (OUTPATIENT)
Dept: LAB | Facility: HOSPITAL | Age: 55
End: 2025-08-27
Attending: NURSE PRACTITIONER

## 2025-08-27 ENCOUNTER — OFFICE VISIT (OUTPATIENT)
Dept: INTERNAL MEDICINE | Facility: CLINIC | Age: 55
End: 2025-08-27

## 2025-08-27 VITALS
TEMPERATURE: 98 F | HEART RATE: 84 BPM | HEIGHT: 67 IN | BODY MASS INDEX: 37.25 KG/M2 | DIASTOLIC BLOOD PRESSURE: 70 MMHG | RESPIRATION RATE: 18 BRPM | SYSTOLIC BLOOD PRESSURE: 121 MMHG | OXYGEN SATURATION: 96 % | WEIGHT: 237.31 LBS

## 2025-08-27 DIAGNOSIS — E66.812 CLASS 2 SEVERE OBESITY DUE TO EXCESS CALORIES WITH SERIOUS COMORBIDITY AND BODY MASS INDEX (BMI) OF 37.0 TO 37.9 IN ADULT: ICD-10-CM

## 2025-08-27 DIAGNOSIS — E78.2 MIXED HYPERLIPIDEMIA: ICD-10-CM

## 2025-08-27 DIAGNOSIS — E11.9 TYPE 2 DIABETES MELLITUS WITHOUT COMPLICATION, WITHOUT LONG-TERM CURRENT USE OF INSULIN: Primary | ICD-10-CM

## 2025-08-27 DIAGNOSIS — E66.01 CLASS 2 SEVERE OBESITY DUE TO EXCESS CALORIES WITH SERIOUS COMORBIDITY AND BODY MASS INDEX (BMI) OF 37.0 TO 37.9 IN ADULT: ICD-10-CM

## 2025-08-27 DIAGNOSIS — E11.9 TYPE 2 DIABETES MELLITUS WITHOUT COMPLICATION, WITHOUT LONG-TERM CURRENT USE OF INSULIN: ICD-10-CM

## 2025-08-27 DIAGNOSIS — I10 PRIMARY HYPERTENSION: ICD-10-CM

## 2025-08-27 LAB
EST. AVERAGE GLUCOSE BLD GHB EST-MCNC: 223.1 MG/DL
HBA1C MFR BLD: 9.4 %

## 2025-08-27 PROCEDURE — 36415 COLL VENOUS BLD VENIPUNCTURE: CPT

## 2025-08-27 PROCEDURE — 99214 OFFICE O/P EST MOD 30 MIN: CPT | Mod: S$PBB,,, | Performed by: NURSE PRACTITIONER

## 2025-08-27 PROCEDURE — 99214 OFFICE O/P EST MOD 30 MIN: CPT | Mod: PBBFAC | Performed by: NURSE PRACTITIONER

## 2025-08-27 PROCEDURE — 83036 HEMOGLOBIN GLYCOSYLATED A1C: CPT

## 2025-08-27 RX ORDER — GLIPIZIDE 5 MG/1
5 TABLET, FILM COATED, EXTENDED RELEASE ORAL
Qty: 90 TABLET | Refills: 2 | Status: SHIPPED | OUTPATIENT
Start: 2025-08-27 | End: 2026-08-27

## 2025-08-27 RX ORDER — AMLODIPINE BESYLATE 10 MG/1
10 TABLET ORAL DAILY
Qty: 90 TABLET | Refills: 2 | Status: SHIPPED | OUTPATIENT
Start: 2025-08-27

## 2025-08-27 RX ORDER — LOSARTAN POTASSIUM 50 MG/1
50 TABLET ORAL DAILY
Qty: 90 TABLET | Refills: 2 | Status: SHIPPED | OUTPATIENT
Start: 2025-08-27

## 2025-08-27 RX ORDER — METFORMIN HYDROCHLORIDE 500 MG/1
1000 TABLET, EXTENDED RELEASE ORAL 2 TIMES DAILY
Qty: 360 TABLET | Refills: 2 | Status: SHIPPED | OUTPATIENT
Start: 2025-08-27

## 2025-08-27 RX ORDER — ATORVASTATIN CALCIUM 10 MG/1
10 TABLET, FILM COATED ORAL NIGHTLY
Qty: 90 TABLET | Refills: 2 | Status: SHIPPED | OUTPATIENT
Start: 2025-08-27

## (undated) DEVICE — KIT SURGICAL COLON .25 1.1OZ

## (undated) DEVICE — MANIFOLD 4 PORT